# Patient Record
Sex: MALE | Race: WHITE | NOT HISPANIC OR LATINO
[De-identification: names, ages, dates, MRNs, and addresses within clinical notes are randomized per-mention and may not be internally consistent; named-entity substitution may affect disease eponyms.]

---

## 2017-01-01 ENCOUNTER — TRANSCRIPTION ENCOUNTER (OUTPATIENT)
Age: 47
End: 2017-01-01

## 2020-11-24 PROBLEM — Z00.00 ENCOUNTER FOR PREVENTIVE HEALTH EXAMINATION: Status: ACTIVE | Noted: 2020-11-24

## 2021-03-08 ENCOUNTER — TRANSCRIPTION ENCOUNTER (OUTPATIENT)
Age: 51
End: 2021-03-08

## 2021-07-30 ENCOUNTER — APPOINTMENT (OUTPATIENT)
Dept: UROLOGY | Facility: CLINIC | Age: 51
End: 2021-07-30
Payer: MEDICAID

## 2021-07-30 VITALS
TEMPERATURE: 98.2 F | DIASTOLIC BLOOD PRESSURE: 87 MMHG | WEIGHT: 190 LBS | SYSTOLIC BLOOD PRESSURE: 123 MMHG | HEIGHT: 72 IN | BODY MASS INDEX: 25.73 KG/M2 | HEART RATE: 80 BPM

## 2021-07-30 PROCEDURE — 99204 OFFICE O/P NEW MOD 45 MIN: CPT

## 2021-07-30 NOTE — PHYSICAL EXAM
[General Appearance - Well Developed] : well developed [General Appearance - Well Nourished] : well nourished [Normal Appearance] : normal appearance [Well Groomed] : well groomed [General Appearance - In No Acute Distress] : no acute distress [Edema] : no peripheral edema [Respiration, Rhythm And Depth] : normal respiratory rhythm and effort [Exaggerated Use Of Accessory Muscles For Inspiration] : no accessory muscle use [Auscultation Breath Sounds / Voice Sounds] : lungs were clear to auscultation bilaterally [Bowel Sounds] : normal bowel sounds [Abdomen Soft] : soft [Abdomen Tenderness] : non-tender [Abdomen Mass (___ Cm)] : no abdominal mass palpated [Abdomen Hernia] : no hernia was discovered [Costovertebral Angle Tenderness] : no ~M costovertebral angle tenderness [Urethral Meatus] : meatus normal [Penis Abnormality] : normal circumcised penis [Urinary Bladder Findings] : the bladder was normal on palpation [Scrotum] : the scrotum was normal [Epididymis] : the epididymides were normal [Testes Tenderness] : no tenderness of the testes [Testes Mass (___cm)] : there were no testicular masses [Anus Abnormality] : the anus and perineum were normal [Rectal Exam - Rectum] : digital rectal exam was normal [Prostate Tenderness] : the prostate was not tender [No Prostate Nodules] : no prostate nodules [Prostate Size ___ gm] : prostate size [unfilled] gm [Normal Station and Gait] : the gait and station were normal for the patient's age [] : no rash [No Focal Deficits] : no focal deficits [Oriented To Time, Place, And Person] : oriented to person, place, and time [Affect] : the affect was normal [Mood] : the mood was normal [Not Anxious] : not anxious [No Palpable Adenopathy] : no palpable adenopathy [FreeTextEntry1] : Left epididymal head is swollen and tender; left vas is tender; a grade 1 left varicocele is present and is tender and decompresses in the supine position.

## 2021-07-30 NOTE — ASSESSMENT
[FreeTextEntry1] : 1.)  Left testicular pain\par Left epididymitis, and left varicocele\par 7/13/2021 elevated WBC 24.8; absolute lymphocyte count 16,591 (850–3900); absolute monocytes 2877 (200–950); smudge cells present.\par \par Color-flow Doppler scrotal sonogram ordered.\par Levofloxacin 500 mg p.o. daily x2 weeks\par Conservative management reviewed with patient for varicocele.\par Repeat the CBC in 2 weeks\par RTO 3 weeks for reevaluation.

## 2021-07-30 NOTE — REVIEW OF SYSTEMS
[Negative] : Heme/Lymph [Eyesight Problems] : eyesight problems [Shortness Of Breath] : shortness of breath [Abdominal Pain] : abdominal pain

## 2021-07-30 NOTE — HISTORY OF PRESENT ILLNESS
[FreeTextEntry1] : The patient is a 50-year-old gentleman who was seen in the office today for the above.  The left testicular pain started about 6 weeks ago and was at a pain scale of 2/10.  It gradually increased and is now at a 6/10 level.  It has been constant and radiates into the left lower quadrant of the abdomen.  No position makes it better or worse.  It is not associated with nausea or vomiting nor is it associated with urinary burning, increased frequency, increased urgency, gross blood in the urine, fever, chills, or night sweats.\par His daytime frequency is 5 times a day with nocturia x1.  He denies all other urological and constitutional symptomatology.\par \par Past Urological History: Negative\par \par Urological Family History:\par Father-nephrolithiasis

## 2021-08-05 ENCOUNTER — OUTPATIENT (OUTPATIENT)
Dept: OUTPATIENT SERVICES | Facility: HOSPITAL | Age: 51
LOS: 1 days | End: 2021-08-05
Payer: MEDICAID

## 2021-08-05 ENCOUNTER — RESULT REVIEW (OUTPATIENT)
Age: 51
End: 2021-08-05

## 2021-08-05 ENCOUNTER — APPOINTMENT (OUTPATIENT)
Dept: ULTRASOUND IMAGING | Facility: CLINIC | Age: 51
End: 2021-08-05
Payer: MEDICAID

## 2021-08-05 DIAGNOSIS — N50.812 LEFT TESTICULAR PAIN: ICD-10-CM

## 2021-08-05 PROCEDURE — 76870 US EXAM SCROTUM: CPT

## 2021-08-05 PROCEDURE — 93975 VASCULAR STUDY: CPT | Mod: 26

## 2021-08-05 PROCEDURE — 76870 US EXAM SCROTUM: CPT | Mod: 26

## 2021-08-05 PROCEDURE — 93975 VASCULAR STUDY: CPT

## 2021-08-16 ENCOUNTER — LABORATORY RESULT (OUTPATIENT)
Age: 51
End: 2021-08-16

## 2021-08-17 ENCOUNTER — NON-APPOINTMENT (OUTPATIENT)
Age: 51
End: 2021-08-17

## 2021-08-18 ENCOUNTER — TRANSCRIPTION ENCOUNTER (OUTPATIENT)
Age: 51
End: 2021-08-18

## 2021-08-24 LAB
BASOPHILS # BLD AUTO: 0.07 K/UL
BASOPHILS NFR BLD AUTO: 0.3 %
EOSINOPHIL # BLD AUTO: 0.2 K/UL
EOSINOPHIL NFR BLD AUTO: 0.8 %
HCT VFR BLD CALC: 45.8 %
HGB BLD-MCNC: 15.7 G/DL
IMM GRANULOCYTES NFR BLD AUTO: 0.2 %
LYMPHOCYTES # BLD AUTO: 18.36 K/UL
LYMPHOCYTES NFR BLD AUTO: 69.5 %
MAN DIFF?: NORMAL
MCHC RBC-ENTMCNC: 30.2 PG
MCHC RBC-ENTMCNC: 34.3 GM/DL
MCV RBC AUTO: 88.1 FL
MONOCYTES # BLD AUTO: 2.03 K/UL
MONOCYTES NFR BLD AUTO: 7.7 %
NEUTROPHILS # BLD AUTO: 5.7 K/UL
NEUTROPHILS NFR BLD AUTO: 21.5 %
PLATELET # BLD AUTO: 247 K/UL
RBC # BLD: 5.2 M/UL
RBC # FLD: 12.8 %
WBC # FLD AUTO: 26.42 K/UL

## 2021-08-27 ENCOUNTER — APPOINTMENT (OUTPATIENT)
Dept: UROLOGY | Facility: CLINIC | Age: 51
End: 2021-08-27
Payer: MEDICAID

## 2021-08-27 VITALS — TEMPERATURE: 98.1 F

## 2021-08-27 PROCEDURE — 99213 OFFICE O/P EST LOW 20 MIN: CPT

## 2021-08-27 NOTE — ASSESSMENT
[FreeTextEntry1] : 1.)  Left testicular pain\par left varicocele\par 7/13/2021 elevated WBC 24.8; absolute lymphocyte count 16,591 (850–3900); absolute monocytes 2877 (200–950); smudge cells present.\par 8/16/2021: WBC 26.42, hemoglobin 15.7, hematocrit 45.8, platelets 274; lymphocytes elevated at 18.36 (1.00–3.3)\par The patient was referred to hematology for consultation.\par \par 8/5/2021 color-flow Doppler scrotal sonogram : Normal\par Conservative management reviewed with patient for varicocele.\par \par RTO as needed.\par .

## 2021-08-27 NOTE — HISTORY OF PRESENT ILLNESS
[FreeTextEntry1] : The patient is a 50-year-old gentleman who was seen in the office today for the above.  At last visit, he was treated with Levaquin 500 mg p.o. daily for 2 weeks and conservative management for a left varicocele.  Today, he reports that the left testicular pain has markedly decreased to 1/10 from a 7/10.  He feels it mostly in the morning when he wakes up and that is the only time of day that it recurs.  It is tolerable and he is not concerned.  He did not follow the conservative protocol for treatment of a varicocele because the pain had subsided.\par \par His past medical history, surgical history, medication history and allergy history are unchanged.

## 2021-08-27 NOTE — PHYSICAL EXAM
[General Appearance - Well Developed] : well developed [General Appearance - Well Nourished] : well nourished [Normal Appearance] : normal appearance [Well Groomed] : well groomed [General Appearance - In No Acute Distress] : no acute distress [Urethral Meatus] : meatus normal [Penis Abnormality] : normal circumcised penis [Urinary Bladder Findings] : the bladder was normal on palpation [Scrotum] : the scrotum was normal [Epididymis] : the epididymides were normal [Testes Tenderness] : no tenderness of the testes [Testes Mass (___cm)] : there were no testicular masses [Anus Abnormality] : the anus and perineum were normal [Prostate Tenderness] : the prostate was not tender [Rectal Exam - Rectum] : digital rectal exam was normal [No Prostate Nodules] : no prostate nodules [Prostate Size ___ gm] : prostate size [unfilled] gm [] : no rash [Oriented To Time, Place, And Person] : oriented to person, place, and time [Affect] : the affect was normal [Mood] : the mood was normal [Not Anxious] : not anxious [Normal Station and Gait] : the gait and station were normal for the patient's age [No Focal Deficits] : no focal deficits [FreeTextEntry1] : Left epididymal head is ;  a grade 1 left varicocele is present but not tender as it was during the last visit and decompresses in the supine position.

## 2021-09-21 ENCOUNTER — OUTPATIENT (OUTPATIENT)
Dept: OUTPATIENT SERVICES | Facility: HOSPITAL | Age: 51
LOS: 1 days | Discharge: HOME | End: 2021-09-21

## 2021-09-21 DIAGNOSIS — D72.829 ELEVATED WHITE BLOOD CELL COUNT, UNSPECIFIED: ICD-10-CM

## 2021-09-23 ENCOUNTER — RESULT REVIEW (OUTPATIENT)
Age: 51
End: 2021-09-23

## 2021-09-23 ENCOUNTER — LABORATORY RESULT (OUTPATIENT)
Age: 51
End: 2021-09-23

## 2021-09-23 ENCOUNTER — OUTPATIENT (OUTPATIENT)
Dept: OUTPATIENT SERVICES | Facility: HOSPITAL | Age: 51
LOS: 1 days | End: 2021-09-23
Payer: MEDICAID

## 2021-09-23 ENCOUNTER — APPOINTMENT (OUTPATIENT)
Dept: HEMATOLOGY ONCOLOGY | Facility: CLINIC | Age: 51
End: 2021-09-23
Payer: MEDICAID

## 2021-09-23 VITALS
SYSTOLIC BLOOD PRESSURE: 114 MMHG | HEIGHT: 72 IN | DIASTOLIC BLOOD PRESSURE: 76 MMHG | HEART RATE: 80 BPM | WEIGHT: 188.49 LBS | BODY MASS INDEX: 25.53 KG/M2 | OXYGEN SATURATION: 98 % | RESPIRATION RATE: 17 BRPM | TEMPERATURE: 97.4 F

## 2021-09-23 DIAGNOSIS — Z78.9 OTHER SPECIFIED HEALTH STATUS: ICD-10-CM

## 2021-09-23 DIAGNOSIS — N50.812 LEFT TESTICULAR PAIN: ICD-10-CM

## 2021-09-23 DIAGNOSIS — I86.1 SCROTAL VARICES: ICD-10-CM

## 2021-09-23 DIAGNOSIS — C91.10 CHRONIC LYMPHOCYTIC LEUKEMIA OF B-CELL TYPE NOT HAVING ACHIEVED REMISSION: ICD-10-CM

## 2021-09-23 DIAGNOSIS — N45.1 EPIDIDYMITIS: ICD-10-CM

## 2021-09-23 LAB
BASOPHILS # BLD AUTO: 0 K/UL — SIGNIFICANT CHANGE UP (ref 0–0.2)
BASOPHILS NFR BLD AUTO: 0 % — SIGNIFICANT CHANGE UP (ref 0–2)
DAT POLY-SP REAG RBC QL: NEGATIVE — SIGNIFICANT CHANGE UP
EOSINOPHIL # BLD AUTO: 0.3 K/UL — SIGNIFICANT CHANGE UP (ref 0–0.5)
EOSINOPHIL NFR BLD AUTO: 1 % — SIGNIFICANT CHANGE UP (ref 0–6)
HCT VFR BLD CALC: 47.1 % — SIGNIFICANT CHANGE UP (ref 39–50)
HGB BLD-MCNC: 16.3 G/DL — SIGNIFICANT CHANGE UP (ref 13–17)
LYMPHOCYTES # BLD AUTO: 18.92 K/UL — HIGH (ref 1–3.3)
LYMPHOCYTES # BLD AUTO: 64 % — HIGH (ref 13–44)
LYMPHOCYTES # SPEC AUTO: 13 % — HIGH (ref 0–0)
MCHC RBC-ENTMCNC: 29.9 PG — SIGNIFICANT CHANGE UP (ref 27–34)
MCHC RBC-ENTMCNC: 34.6 G/DL — SIGNIFICANT CHANGE UP (ref 32–36)
MCV RBC AUTO: 86.3 FL — SIGNIFICANT CHANGE UP (ref 80–100)
MONOCYTES # BLD AUTO: 0.59 K/UL — SIGNIFICANT CHANGE UP (ref 0–0.9)
MONOCYTES NFR BLD AUTO: 2 % — SIGNIFICANT CHANGE UP (ref 2–14)
NEUTROPHILS # BLD AUTO: 5.91 K/UL — SIGNIFICANT CHANGE UP (ref 1.8–7.4)
NEUTROPHILS NFR BLD AUTO: 20 % — LOW (ref 43–77)
NRBC # BLD: 0 /100 — SIGNIFICANT CHANGE UP (ref 0–0)
NRBC # BLD: SIGNIFICANT CHANGE UP /100 WBCS (ref 0–0)
PLAT MORPH BLD: NORMAL — SIGNIFICANT CHANGE UP
PLATELET # BLD AUTO: 271 K/UL — SIGNIFICANT CHANGE UP (ref 150–400)
RBC # BLD: 5.46 M/UL — SIGNIFICANT CHANGE UP (ref 4.2–5.8)
RBC # FLD: 12.7 % — SIGNIFICANT CHANGE UP (ref 10.3–14.5)
RBC BLD AUTO: NORMAL — SIGNIFICANT CHANGE UP
RETICS #: 80.4 K/UL — SIGNIFICANT CHANGE UP (ref 25–125)
RETICS/RBC NFR: 1.5 % — SIGNIFICANT CHANGE UP (ref 0.5–2.5)
SMUDGE CELLS # BLD: PRESENT — SIGNIFICANT CHANGE UP
WBC # BLD: 29.56 K/UL — HIGH (ref 3.8–10.5)
WBC # FLD AUTO: 29.56 K/UL — HIGH (ref 3.8–10.5)

## 2021-09-23 PROCEDURE — 86901 BLOOD TYPING SEROLOGIC RH(D): CPT

## 2021-09-23 PROCEDURE — 99205 OFFICE O/P NEW HI 60 MIN: CPT

## 2021-09-23 PROCEDURE — 86850 RBC ANTIBODY SCREEN: CPT

## 2021-09-23 PROCEDURE — 86900 BLOOD TYPING SEROLOGIC ABO: CPT

## 2021-09-23 PROCEDURE — 86880 COOMBS TEST DIRECT: CPT

## 2021-09-23 RX ORDER — LEVOFLOXACIN 500 MG/1
500 TABLET, FILM COATED ORAL
Qty: 14 | Refills: 0 | Status: DISCONTINUED | COMMUNITY
Start: 2021-07-30 | End: 2021-09-23

## 2021-09-24 LAB
MANUAL DIF COMMENT BLD-IMP: SIGNIFICANT CHANGE UP

## 2021-09-25 PROBLEM — N45.1 LEFT EPIDIDYMITIS: Status: RESOLVED | Noted: 2021-07-30 | Resolved: 2021-09-25

## 2021-09-25 PROBLEM — I86.1 LEFT VARICOCELE: Status: ACTIVE | Noted: 2021-07-30

## 2021-09-25 PROBLEM — N50.812 LEFT TESTICULAR PAIN: Status: RESOLVED | Noted: 2021-07-30 | Resolved: 2021-09-25

## 2021-09-25 LAB
BILIRUB INDIRECT SERPL-MCNC: 0.3 MG/DL
HBV SURFACE AB SER QL: NONREACTIVE
HIV1+2 AB SPEC QL IA.RAPID: NONREACTIVE
URATE SERPL-MCNC: 5.1 MG/DL

## 2021-09-25 NOTE — PHYSICAL EXAM
[Fully active, able to carry on all pre-disease performance without restriction] : Status 0 - Fully active, able to carry on all pre-disease performance without restriction [Normal] : normal spine exam without palpable tenderness, no kyphosis or scoliosis [de-identified] : no CVAT

## 2021-09-25 NOTE — REVIEW OF SYSTEMS
[Fatigue] : fatigue [Shortness Of Breath] : shortness of breath [Cough] : cough [SOB on Exertion] : shortness of breath during exertion [Joint Pain] : joint pain [Joint Stiffness] : joint stiffness [Negative] : Allergic/Immunologic [Fever] : no fever [Chills] : no chills [Night Sweats] : no night sweats [Recent Change In Weight] : ~T no recent weight change [Wheezing] : no wheezing [Dysuria] : no dysuria [Muscle Weakness] : no muscle weakness [Muscle Pain] : no muscle pain [Easy Bleeding] : no tendency for easy bleeding [Easy Bruising] : no tendency for easy bruising [Swollen Glands] : no swollen glands [FreeTextEntry6] : longstanding cough, occas HANSEN [FreeTextEntry8] : as above, left testis pain resolved [de-identified] : as above [FreeTextEntry9] : left hip, knee

## 2021-09-25 NOTE — ASSESSMENT
[Palliative Care Plan] : not applicable at this time [FreeTextEntry1] : 50-year-old, previously healthy man with recently noted leukocytosis and lymphocytosis.  Hgb and PLT are normal. CBC today reviewed with patient:\par WBC 29.56, Hgb 16.3, PLT 271K, 20% neuts, 64% lymphs, 13% O lymphs,  ANC 5.91, ALC 18.92.\par Peripheral smear shows increased lymphocytes, generally mature, with smudge cells seen.\par The findings point to diagnosis of CLL although other possibilities need to be excluded, particularly a non-Hodgkin lymphoma in leukemic phase.  The morphology of the PB lymphs are more consistent with CLL.\par The diagnosis will be clarified with peripheral blood flow cytometry to be done today.  Additional studies will include a CMP, LDH, beta-2 MG, retake count, MARK, viral serologies, immunoelectrophoresis, ZAP-70, I GVH      hypermutation assay and CLL FISH panel.\par A baseline CT scan of the neck, chest, abdomen and pelvis will be obtained.\par Mr. Fuentes has not had medical care in many years so it is unclear for how long he may have had a relative or absolute increase in his absolute lymphocyte count.  He was reassured that if the diagnosis is indeed CLL, unless he unexpectedly has bulky adenopathy and/or organomegaly, he would initially be observed and could don's well for an indefinite period of time.\par He will be in touch with me within the next few days to review results.\par

## 2021-09-25 NOTE — HISTORY OF PRESENT ILLNESS
[Disease:__________________________] : Disease: [unfilled] [de-identified] : Mr. Fuentes is a 50-year-old man referred for evaluation of leukocytosis with lymphocytosis.  He has had generally good health.  A few weeks ago, he developed left scrotal pain and had a urologic evaluation which demonstrated left epididymitis and a varicocele.  He improved rapidly, was treated with a 2 wk course of levaquin.   Color–flow Doppler scrotal sonogram was normal.\par On 7/13/2021, the WBC was 24.8 with an ALC of 16.59.  Smudge cells were seen present.  Repeat CBC on 8/16 showed the WBC to be 26.42 with a similar absolute lymphocyte count.  Hgb and PLT were normal both times.\par He has several orthopedic complaints and is active in sports.  His past history is otherwise unremarkable.  He has no constitutional complaints. [de-identified] : pending

## 2021-10-05 ENCOUNTER — APPOINTMENT (OUTPATIENT)
Dept: CT IMAGING | Facility: CLINIC | Age: 51
End: 2021-10-05
Payer: MEDICAID

## 2021-10-05 ENCOUNTER — OUTPATIENT (OUTPATIENT)
Dept: OUTPATIENT SERVICES | Facility: HOSPITAL | Age: 51
LOS: 1 days | End: 2021-10-05
Payer: MEDICAID

## 2021-10-05 DIAGNOSIS — C91.10 CHRONIC LYMPHOCYTIC LEUKEMIA OF B-CELL TYPE NOT HAVING ACHIEVED REMISSION: ICD-10-CM

## 2021-10-05 PROCEDURE — 71260 CT THORAX DX C+: CPT | Mod: 26

## 2021-10-05 PROCEDURE — 74177 CT ABD & PELVIS W/CONTRAST: CPT | Mod: 26

## 2021-10-05 PROCEDURE — 74177 CT ABD & PELVIS W/CONTRAST: CPT

## 2021-10-05 PROCEDURE — 71260 CT THORAX DX C+: CPT

## 2021-11-04 ENCOUNTER — APPOINTMENT (OUTPATIENT)
Dept: HEMATOLOGY ONCOLOGY | Facility: CLINIC | Age: 51
End: 2021-11-04
Payer: MEDICAID

## 2021-11-04 PROCEDURE — 99204 OFFICE O/P NEW MOD 45 MIN: CPT | Mod: 95

## 2021-11-04 NOTE — PHYSICAL EXAM
[Fully active, able to carry on all pre-disease performance without restriction] : Status 0 - Fully active, able to carry on all pre-disease performance without restriction [Normal] : affect appropriate [de-identified] : no CVAT

## 2021-11-04 NOTE — HISTORY OF PRESENT ILLNESS
[Disease:__________________________] : Disease: [unfilled] [de-identified] : Mr. Fuentes is a 51 year old male presenting today for 2nd opinion for recent diagnosis of CLL. He initially presented with leukocytosis with lymphocytosis and was seen by our collegue  Dr. Frazier, who did full work up to reveal diagnosis of CLL. Flow cytometry done on 9/23/21 shows  Monotypic B-cells (45% of cells), positive for kappa, CD19, dim CD20, CD23, CD5; negative FMC-7, CD10, CD38, consistent with small lymphocytic lymphoma/chronic lymphocytic leukemia (CD38 negative). 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 IGHV... Most recent cbc with differential done on 9/23/21 shows WBC 29.56, Hgb 16.3, Plt 271, ALC 19.92, and ANC 5.91.He has had generally good health.\par Patient's aunt is a patient of ours (patient is Keri Robledo, diagnosed with CLL over 15 years ago). Patient would like participate in the tissue banking study for CLL. [de-identified] : 13 q\par IGHV...\par  [de-identified] : 11/4/21:Overall patient feel very well. No fevers/chills. No drenching night sweats. No enlarged/botehrsome lymph nodes. No CP/SOB. No abd pain. No easy bruising/bleeding. Normal GI/. Appetite is good. Weight stable. Energy levels good. Patient is active. No recent/recurrent infections. No new medications.

## 2021-11-04 NOTE — REASON FOR VISIT
[Initial Consultation] : an initial consultation for [Home] : at home, [unfilled] , at the time of the visit. [Medical Office: ([facility])___] : at the medical office located in  [CLL] : chronic lymphocytic leukemia [Spouse] : spouse

## 2021-11-04 NOTE — REVIEW OF SYSTEMS
[Fatigue] : fatigue [Shortness Of Breath] : shortness of breath [Cough] : cough [SOB on Exertion] : shortness of breath during exertion [Joint Pain] : joint pain [Joint Stiffness] : joint stiffness [Negative] : Allergic/Immunologic [Fever] : no fever [Chills] : no chills [Night Sweats] : no night sweats [Recent Change In Weight] : ~T no recent weight change [Wheezing] : no wheezing [Dysuria] : no dysuria [Muscle Pain] : no muscle pain [Muscle Weakness] : no muscle weakness [Easy Bleeding] : no tendency for easy bleeding [Easy Bruising] : no tendency for easy bruising [Swollen Glands] : no swollen glands [FreeTextEntry6] : longstanding cough, occas HANSEN [FreeTextEntry8] : as above, left testis pain resolved [FreeTextEntry9] : left hip, knee [de-identified] : as above

## 2021-11-04 NOTE — ASSESSMENT
[Palliative Care Plan] : not applicable at this time [FreeTextEntry1] : 50-year-old male, relatively  healthy with recently noted leukocytosis and lymphocytosis, was seen by our colleague Dr. Frazier for further work up and was diagnosed with CLL by flow cytometry on 9/23/21. 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 IGHV...  Patient presents today for initial consultation seeking 2nd opinion. \par \par CLL:\par -9/23/21 CBC with differential: WBC 29.56, Hgb 16.3, PLT 271K, 20% neuts, 64% lymphs, 13% O lymphs,  ANC 5.91, ALC 18.92. \par -We had a long discussion with the patient  about the clinical nature of CLL. We have discussed that we would not recommend any therapy at this time given that he is asymptomatic with no anemia or concerning thrombocytopenia. We explained that while CLL is not curable, it is a chronic condition and therefore we do not treat it unless it is clinically indicated. We went over the indications for therapy including bulky LAD, splenomegaly, rapidly increasing WBC, anemia, thrombocytopenia or any constitutional complaints.  \par  We have advised the patient to continue with routine maintenance and age appropriate screening including prostate  check, colonoscopy, dermatology evaluation, and flu vaccine. We have explained that he should avoid all live vaccines. We have discussed the increased risk of infection associated with CLL and that the patient should be aware to alert any physicians or treating providers of this diagnosis.\par -patient to continue to follow up with Dr. Frazier for close observation\par -will coordinate with patient and extended family for tissue banking samples \par

## 2022-01-07 ENCOUNTER — OUTPATIENT (OUTPATIENT)
Dept: OUTPATIENT SERVICES | Facility: HOSPITAL | Age: 52
LOS: 1 days | Discharge: HOME | End: 2022-01-07

## 2022-01-07 DIAGNOSIS — D72.829 ELEVATED WHITE BLOOD CELL COUNT, UNSPECIFIED: ICD-10-CM

## 2022-01-10 ENCOUNTER — RESULT REVIEW (OUTPATIENT)
Age: 52
End: 2022-01-10

## 2022-01-10 ENCOUNTER — APPOINTMENT (OUTPATIENT)
Dept: HEMATOLOGY ONCOLOGY | Facility: CLINIC | Age: 52
End: 2022-01-10
Payer: MEDICAID

## 2022-01-10 VITALS
SYSTOLIC BLOOD PRESSURE: 128 MMHG | OXYGEN SATURATION: 99 % | HEART RATE: 81 BPM | BODY MASS INDEX: 26.1 KG/M2 | DIASTOLIC BLOOD PRESSURE: 86 MMHG | TEMPERATURE: 97.1 F | RESPIRATION RATE: 17 BRPM | WEIGHT: 192.44 LBS

## 2022-01-10 LAB
BASOPHILS # BLD AUTO: 0 K/UL — SIGNIFICANT CHANGE UP (ref 0–0.2)
BASOPHILS NFR BLD AUTO: 0 % — SIGNIFICANT CHANGE UP (ref 0–2)
EOSINOPHIL # BLD AUTO: 0.29 K/UL — SIGNIFICANT CHANGE UP (ref 0–0.5)
EOSINOPHIL NFR BLD AUTO: 1 % — SIGNIFICANT CHANGE UP (ref 0–6)
HCT VFR BLD CALC: 50.2 % — HIGH (ref 39–50)
HGB BLD-MCNC: 16.7 G/DL — SIGNIFICANT CHANGE UP (ref 13–17)
LYMPHOCYTES # BLD AUTO: 14.27 K/UL — HIGH (ref 1–3.3)
LYMPHOCYTES # BLD AUTO: 49 % — HIGH (ref 13–44)
LYMPHOCYTES # SPEC AUTO: 16 % — HIGH (ref 0–0)
MCHC RBC-ENTMCNC: 29.7 PG — SIGNIFICANT CHANGE UP (ref 27–34)
MCHC RBC-ENTMCNC: 33.3 G/DL — SIGNIFICANT CHANGE UP (ref 32–36)
MCV RBC AUTO: 89.2 FL — SIGNIFICANT CHANGE UP (ref 80–100)
MONOCYTES # BLD AUTO: 0.87 K/UL — SIGNIFICANT CHANGE UP (ref 0–0.9)
MONOCYTES NFR BLD AUTO: 3 % — SIGNIFICANT CHANGE UP (ref 2–14)
NEUTROPHILS # BLD AUTO: 9.03 K/UL — HIGH (ref 1.8–7.4)
NEUTROPHILS NFR BLD AUTO: 31 % — LOW (ref 43–77)
NRBC # BLD: 0 /100 — SIGNIFICANT CHANGE UP (ref 0–0)
NRBC # BLD: SIGNIFICANT CHANGE UP /100 WBCS (ref 0–0)
PLAT MORPH BLD: NORMAL — SIGNIFICANT CHANGE UP
PLATELET # BLD AUTO: 249 K/UL — SIGNIFICANT CHANGE UP (ref 150–400)
RBC # BLD: 5.63 M/UL — SIGNIFICANT CHANGE UP (ref 4.2–5.8)
RBC # FLD: 12.7 % — SIGNIFICANT CHANGE UP (ref 10.3–14.5)
RBC BLD AUTO: NORMAL — SIGNIFICANT CHANGE UP
SMUDGE CELLS # BLD: PRESENT — SIGNIFICANT CHANGE UP
WBC # BLD: 29.13 K/UL — HIGH (ref 3.8–10.5)
WBC # FLD AUTO: 29.13 K/UL — HIGH (ref 3.8–10.5)

## 2022-01-10 PROCEDURE — 99214 OFFICE O/P EST MOD 30 MIN: CPT

## 2022-01-14 NOTE — PHYSICAL EXAM
[Fully active, able to carry on all pre-disease performance without restriction] : Status 0 - Fully active, able to carry on all pre-disease performance without restriction [Normal] : affect appropriate [de-identified] : no CVAT

## 2022-01-14 NOTE — ASSESSMENT
[Palliative Care Plan] : not applicable at this time [FreeTextEntry1] : 50-year-old, previously healthy man with recently diagnosed B-CLL.  Stage 0.\par He has been generally well.\par CBC results reviewed and d/w pt\par WBC   29.13, Hgb 16.7, PLT 249K, ANC 5.91, ALC 18.92.\par CT C/A/P 10/5/2021 showed no LAD or organomegaly.  Flow cytometry shows a P CLL phenotype, CD38(-).  Immunoelectrophoresis showed a normal SPEP, negative immunofixation, normal immunoglobulin levels except for an IgM of 31.  MARK was negative.  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  He was reassured again that he is in no danger and that he would just have to be watched on a regular basis and that it was quite likely that at some point he would require treatment.\par Blood work today including CMP, LDH, uric acid.\par He is fully vaccinated against COVID.\par RV 4 months or as needed [Palliative] : Goals of care discussed with patient: Palliative

## 2022-01-14 NOTE — REVIEW OF SYSTEMS
[Fatigue] : fatigue [Cough] : cough [SOB on Exertion] : shortness of breath during exertion [Joint Pain] : joint pain [Joint Stiffness] : joint stiffness [Negative] : Allergic/Immunologic [Fever] : no fever [Chills] : no chills [Night Sweats] : no night sweats [Recent Change In Weight] : ~T no recent weight change [Wheezing] : no wheezing [Dysuria] : no dysuria [Muscle Pain] : no muscle pain [Muscle Weakness] : no muscle weakness [Easy Bleeding] : no tendency for easy bleeding [Easy Bruising] : no tendency for easy bruising [Swollen Glands] : no swollen glands [FreeTextEntry9] : left hip, knee [de-identified] : as above

## 2022-01-14 NOTE — HISTORY OF PRESENT ILLNESS
[Disease:__________________________] : Disease: [unfilled] [de-identified] : Mr. Fuentes is a 50-year-old man referred for evaluation of leukocytosis with lymphocytosis.  He has had generally good health.  A few weeks ago, he developed left scrotal pain and had a urologic evaluation which demonstrated left epididymitis and a varicocele.  He improved rapidly, was treated with a 2 wk course of levaquin.   Color–flow Doppler scrotal sonogram was normal.\par On 7/13/2021, the WBC was 24.8 with an ALC of 16.59.  Smudge cells were seen present.  Repeat CBC on 8/16 showed the WBC to be 26.42 with a similar absolute lymphocyte count.  Hgb and PLT were normal both times.\par He has several orthopedic complaints and is active in sports.  His past history is otherwise unremarkable.  He has no constitutional complaints. [de-identified] : pending [de-identified] : Feeling well\par No constitutional c/o\par completed initial evaluation establishing dx, EOD, risk factors 9/2021.

## 2022-04-20 ENCOUNTER — OUTPATIENT (OUTPATIENT)
Dept: OUTPATIENT SERVICES | Facility: HOSPITAL | Age: 52
LOS: 1 days | Discharge: HOME | End: 2022-04-20

## 2022-04-20 DIAGNOSIS — D72.829 ELEVATED WHITE BLOOD CELL COUNT, UNSPECIFIED: ICD-10-CM

## 2022-04-26 ENCOUNTER — LABORATORY RESULT (OUTPATIENT)
Age: 52
End: 2022-04-26

## 2022-04-26 ENCOUNTER — RESULT REVIEW (OUTPATIENT)
Age: 52
End: 2022-04-26

## 2022-04-26 ENCOUNTER — APPOINTMENT (OUTPATIENT)
Dept: HEMATOLOGY ONCOLOGY | Facility: CLINIC | Age: 52
End: 2022-04-26
Payer: MEDICAID

## 2022-04-26 VITALS
BODY MASS INDEX: 25.76 KG/M2 | DIASTOLIC BLOOD PRESSURE: 80 MMHG | WEIGHT: 186.07 LBS | TEMPERATURE: 98.2 F | HEART RATE: 79 BPM | HEIGHT: 71.18 IN | SYSTOLIC BLOOD PRESSURE: 121 MMHG | OXYGEN SATURATION: 99 % | RESPIRATION RATE: 16 BRPM

## 2022-04-26 LAB
BASOPHILS # BLD AUTO: 0 K/UL — SIGNIFICANT CHANGE UP (ref 0–0.2)
BASOPHILS NFR BLD AUTO: 0 % — SIGNIFICANT CHANGE UP (ref 0–2)
EOSINOPHIL # BLD AUTO: 0 K/UL — SIGNIFICANT CHANGE UP (ref 0–0.5)
EOSINOPHIL NFR BLD AUTO: 0 % — SIGNIFICANT CHANGE UP (ref 0–6)
HCT VFR BLD CALC: 44.8 % — SIGNIFICANT CHANGE UP (ref 39–50)
HGB BLD-MCNC: 15 G/DL — SIGNIFICANT CHANGE UP (ref 13–17)
LYMPHOCYTES # BLD AUTO: 15.38 K/UL — HIGH (ref 1–3.3)
LYMPHOCYTES # BLD AUTO: 73 % — HIGH (ref 13–44)
LYMPHOCYTES # SPEC AUTO: 12 % — HIGH (ref 0–0)
MCHC RBC-ENTMCNC: 29.6 PG — SIGNIFICANT CHANGE UP (ref 27–34)
MCHC RBC-ENTMCNC: 33.5 G/DL — SIGNIFICANT CHANGE UP (ref 32–36)
MCV RBC AUTO: 88.5 FL — SIGNIFICANT CHANGE UP (ref 80–100)
MONOCYTES # BLD AUTO: 0.84 K/UL — SIGNIFICANT CHANGE UP (ref 0–0.9)
MONOCYTES NFR BLD AUTO: 4 % — SIGNIFICANT CHANGE UP (ref 2–14)
NEUTROPHILS # BLD AUTO: 2.32 K/UL — SIGNIFICANT CHANGE UP (ref 1.8–7.4)
NEUTROPHILS NFR BLD AUTO: 11 % — LOW (ref 43–77)
NRBC # BLD: 0 /100 — SIGNIFICANT CHANGE UP (ref 0–0)
NRBC # BLD: SIGNIFICANT CHANGE UP /100 WBCS (ref 0–0)
PLAT MORPH BLD: NORMAL — SIGNIFICANT CHANGE UP
PLATELET # BLD AUTO: 273 K/UL — SIGNIFICANT CHANGE UP (ref 150–400)
RBC # BLD: 5.06 M/UL — SIGNIFICANT CHANGE UP (ref 4.2–5.8)
RBC # FLD: 13.3 % — SIGNIFICANT CHANGE UP (ref 10.3–14.5)
RBC BLD AUTO: SIGNIFICANT CHANGE UP
SMUDGE CELLS # BLD: PRESENT — SIGNIFICANT CHANGE UP
WBC # BLD: 21.07 K/UL — HIGH (ref 3.8–10.5)
WBC # FLD AUTO: 21.07 K/UL — HIGH (ref 3.8–10.5)

## 2022-04-26 PROCEDURE — 99214 OFFICE O/P EST MOD 30 MIN: CPT

## 2022-04-26 RX ORDER — LORAZEPAM 1 MG/1
1 TABLET ORAL
Qty: 2 | Refills: 0 | Status: DISCONTINUED | COMMUNITY
Start: 2021-09-28 | End: 2022-04-26

## 2022-04-30 NOTE — REVIEW OF SYSTEMS
[Fatigue] : fatigue [Cough] : cough [SOB on Exertion] : shortness of breath during exertion [Joint Pain] : joint pain [Joint Stiffness] : joint stiffness [Negative] : Heme/Lymph [Fever] : no fever [Chills] : no chills [Night Sweats] : no night sweats [Recent Change In Weight] : ~T no recent weight change [Wheezing] : no wheezing [Dysuria] : no dysuria [Muscle Pain] : no muscle pain [Muscle Weakness] : no muscle weakness [Easy Bleeding] : no tendency for easy bleeding [Easy Bruising] : no tendency for easy bruising [Swollen Glands] : no swollen glands [FreeTextEntry7] : s/p appendectomy as above [FreeTextEntry9] : left hip, knee chronic

## 2022-04-30 NOTE — HISTORY OF PRESENT ILLNESS
[Disease:__________________________] : Disease: [unfilled] [de-identified] : Mr. Fuentes is a 50-year-old man referred for evaluation of leukocytosis with lymphocytosis.  He has had generally good health.  A few weeks ago, he developed left scrotal pain and had a urologic evaluation which demonstrated left epididymitis and a varicocele.  He improved rapidly, was treated with a 2 wk course of levaquin.   Color–flow Doppler scrotal sonogram was normal.\par On 7/13/2021, the WBC was 24.8 with an ALC of 16.59.  Smudge cells were seen present.  Repeat CBC on 8/16 showed the WBC to be 26.42 with a similar absolute lymphocyte count.  Hgb and PLT were normal both times.\par He has several orthopedic complaints and is active in sports.  His past history is otherwise unremarkable.  He has no constitutional complaints. [de-identified] : pending [de-identified] : Feeling well\par No constitutional c/o\par completed initial evaluation establishing dx, EOD, risk factors 9/2021.\par s/p appendectomy 4/4/22 at [place], uneventful recovery

## 2022-04-30 NOTE — ASSESSMENT
[Palliative] : Goals of care discussed with patient: Palliative [Palliative Care Plan] : not applicable at this time [FreeTextEntry1] : 51-year-old, previously healthy man with B-CLL,  Stage 0.\par He remains generally well except recent episode acute appendicitis, s/p lap  appy earlier 4/2022\par CBC results reviewed and d/w pt\par WBC 21.07,  Hgb 15.0, PLT 273K, ANC 2.32 , ALC 15.38\par CT C/A/P 10/5/2021 showed no LAD or organomegaly.  Flow cytometry showed a B- CLL phenotype, CD38(-).  Immunoelectrophoresis showed a normal SPEP, negative immunofixation, normal immunoglobulin levels except for an IgM of 31.  MARK was negative.  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

## 2022-04-30 NOTE — PHYSICAL EXAM
[Fully active, able to carry on all pre-disease performance without restriction] : Status 0 - Fully active, able to carry on all pre-disease performance without restriction [Normal] : affect appropriate [de-identified] : well healed lap appy scars [de-identified] : no CVAT

## 2022-07-22 ENCOUNTER — OUTPATIENT (OUTPATIENT)
Dept: OUTPATIENT SERVICES | Facility: HOSPITAL | Age: 52
LOS: 1 days | Discharge: HOME | End: 2022-07-22

## 2022-07-22 DIAGNOSIS — D72.829 ELEVATED WHITE BLOOD CELL COUNT, UNSPECIFIED: ICD-10-CM

## 2022-08-02 ENCOUNTER — RESULT REVIEW (OUTPATIENT)
Age: 52
End: 2022-08-02

## 2022-08-02 ENCOUNTER — APPOINTMENT (OUTPATIENT)
Dept: HEMATOLOGY ONCOLOGY | Facility: CLINIC | Age: 52
End: 2022-08-02

## 2022-08-02 VITALS
HEART RATE: 68 BPM | WEIGHT: 185.85 LBS | OXYGEN SATURATION: 98 % | SYSTOLIC BLOOD PRESSURE: 142 MMHG | HEIGHT: 71.73 IN | DIASTOLIC BLOOD PRESSURE: 84 MMHG | BODY MASS INDEX: 25.45 KG/M2 | TEMPERATURE: 98.2 F | RESPIRATION RATE: 16 BRPM

## 2022-08-02 VITALS — SYSTOLIC BLOOD PRESSURE: 133 MMHG | DIASTOLIC BLOOD PRESSURE: 87 MMHG

## 2022-08-02 LAB
BASOPHILS # BLD AUTO: 0 K/UL — SIGNIFICANT CHANGE UP (ref 0–0.2)
BASOPHILS NFR BLD AUTO: 0 % — SIGNIFICANT CHANGE UP (ref 0–2)
EOSINOPHIL # BLD AUTO: 0.24 K/UL — SIGNIFICANT CHANGE UP (ref 0–0.5)
EOSINOPHIL NFR BLD AUTO: 1 % — SIGNIFICANT CHANGE UP (ref 0–6)
HCT VFR BLD CALC: 44.7 % — SIGNIFICANT CHANGE UP (ref 39–50)
HGB BLD-MCNC: 15.3 G/DL — SIGNIFICANT CHANGE UP (ref 13–17)
LYMPHOCYTES # BLD AUTO: 14.4 K/UL — HIGH (ref 1–3.3)
LYMPHOCYTES # BLD AUTO: 60 % — HIGH (ref 13–44)
LYMPHOCYTES # SPEC AUTO: 12 % — HIGH (ref 0–0)
MCHC RBC-ENTMCNC: 30.1 PG — SIGNIFICANT CHANGE UP (ref 27–34)
MCHC RBC-ENTMCNC: 34.2 G/DL — SIGNIFICANT CHANGE UP (ref 32–36)
MCV RBC AUTO: 88 FL — SIGNIFICANT CHANGE UP (ref 80–100)
MONOCYTES # BLD AUTO: 0.96 K/UL — HIGH (ref 0–0.9)
MONOCYTES NFR BLD AUTO: 4 % — SIGNIFICANT CHANGE UP (ref 2–14)
NEUTROPHILS # BLD AUTO: 5.52 K/UL — SIGNIFICANT CHANGE UP (ref 1.8–7.4)
NEUTROPHILS NFR BLD AUTO: 23 % — LOW (ref 43–77)
NRBC # BLD: 0 /100 — SIGNIFICANT CHANGE UP (ref 0–0)
NRBC # BLD: SIGNIFICANT CHANGE UP /100 WBCS (ref 0–0)
PLAT MORPH BLD: NORMAL — SIGNIFICANT CHANGE UP
PLATELET # BLD AUTO: 210 K/UL — SIGNIFICANT CHANGE UP (ref 150–400)
RBC # BLD: 5.08 M/UL — SIGNIFICANT CHANGE UP (ref 4.2–5.8)
RBC # FLD: 12.8 % — SIGNIFICANT CHANGE UP (ref 10.3–14.5)
RBC BLD AUTO: SIGNIFICANT CHANGE UP
SMUDGE CELLS # BLD: PRESENT — SIGNIFICANT CHANGE UP
URATE SERPL-MCNC: 4.4 MG/DL
WBC # BLD: 24 K/UL — HIGH (ref 3.8–10.5)
WBC # FLD AUTO: 24 K/UL — HIGH (ref 3.8–10.5)

## 2022-08-02 PROCEDURE — 99213 OFFICE O/P EST LOW 20 MIN: CPT

## 2022-08-02 NOTE — ASSESSMENT
[Palliative] : Goals of care discussed with patient: Palliative [Palliative Care Plan] : not applicable at this time [FreeTextEntry1] : 51-year-old, previously healthy man with B-CLL,  Stage 0.\par He remains generally well except  episode acute appendicitis earlier in 2022, s/p lap  appy  4/2022\par CBC results reviewed and d/w pt\par WBC 24.00,  Hgb 15.3, PLT 210K, ANC 5.52 , ALC 14.40\par CT C/A/P 10/5/2021 showed no LAD or organomegaly.  Flow cytometry showed a B- CLL phenotype, CD38(-).  Immunoelectrophoresis showed a normal SPEP, negative immunofixation, normal immunoglobulin levels except for an IgM of 31.  MARK was negative.  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

## 2022-08-02 NOTE — PHYSICAL EXAM
[Fully active, able to carry on all pre-disease performance without restriction] : Status 0 - Fully active, able to carry on all pre-disease performance without restriction [Normal] : affect appropriate [de-identified] : well healed lap appy scars [de-identified] : no CVAT

## 2022-08-02 NOTE — HISTORY OF PRESENT ILLNESS
[Disease:__________________________] : Disease: [unfilled] [de-identified] : Mr. Fuentes is a 50-year-old man referred for evaluation of leukocytosis with lymphocytosis.  He has had generally good health.  A few weeks ago, he developed left scrotal pain and had a urologic evaluation which demonstrated left epididymitis and a varicocele.  He improved rapidly, was treated with a 2 wk course of levaquin.   Color–flow Doppler scrotal sonogram was normal.\par On 7/13/2021, the WBC was 24.8 with an ALC of 16.59.  Smudge cells were seen present.  Repeat CBC on 8/16 showed the WBC to be 26.42 with a similar absolute lymphocyte count.  Hgb and PLT were normal both times.\par He has several orthopedic complaints and is active in sports.  His past history is otherwise unremarkable.  He has no constitutional complaints. [de-identified] : pending [de-identified] : Feeling well\par No constitutional c/o\par completed initial evaluation establishing dx, EOD, risk factors 9/2021.\par s/p appendectomy 4/4/22 at [place], uneventful recovery\par no new c/o

## 2022-08-02 NOTE — REVIEW OF SYSTEMS
[Fatigue] : fatigue [Cough] : cough [SOB on Exertion] : shortness of breath during exertion [Joint Pain] : joint pain [Joint Stiffness] : joint stiffness [Negative] : Allergic/Immunologic [Fever] : no fever [Chills] : no chills [Night Sweats] : no night sweats [Recent Change In Weight] : ~T no recent weight change [Wheezing] : no wheezing [Dysuria] : no dysuria [Muscle Pain] : no muscle pain [Muscle Weakness] : no muscle weakness [Easy Bleeding] : no tendency for easy bleeding [Easy Bruising] : no tendency for easy bruising [Swollen Glands] : no swollen glands [FreeTextEntry7] : s/p appendectomy as above [FreeTextEntry9] : left hip, knee chronic

## 2022-12-01 ENCOUNTER — OUTPATIENT (OUTPATIENT)
Dept: OUTPATIENT SERVICES | Facility: HOSPITAL | Age: 52
LOS: 1 days | Discharge: HOME | End: 2022-12-01

## 2022-12-01 DIAGNOSIS — D72.829 ELEVATED WHITE BLOOD CELL COUNT, UNSPECIFIED: ICD-10-CM

## 2022-12-05 ENCOUNTER — RESULT REVIEW (OUTPATIENT)
Age: 52
End: 2022-12-05

## 2022-12-05 ENCOUNTER — APPOINTMENT (OUTPATIENT)
Dept: HEMATOLOGY ONCOLOGY | Facility: CLINIC | Age: 52
End: 2022-12-05

## 2022-12-05 VITALS
SYSTOLIC BLOOD PRESSURE: 119 MMHG | DIASTOLIC BLOOD PRESSURE: 82 MMHG | HEART RATE: 107 BPM | TEMPERATURE: 98.4 F | OXYGEN SATURATION: 96 % | RESPIRATION RATE: 16 BRPM | BODY MASS INDEX: 25.43 KG/M2 | WEIGHT: 186.07 LBS

## 2022-12-05 DIAGNOSIS — D72.820 LYMPHOCYTOSIS (SYMPTOMATIC): ICD-10-CM

## 2022-12-05 LAB
BASOPHILS # BLD AUTO: 0 K/UL — SIGNIFICANT CHANGE UP (ref 0–0.2)
BASOPHILS NFR BLD AUTO: 0 % — SIGNIFICANT CHANGE UP (ref 0–2)
EOSINOPHIL # BLD AUTO: 0.41 K/UL — SIGNIFICANT CHANGE UP (ref 0–0.5)
EOSINOPHIL NFR BLD AUTO: 1 % — SIGNIFICANT CHANGE UP (ref 0–6)
HCT VFR BLD CALC: 49.2 % — SIGNIFICANT CHANGE UP (ref 39–50)
HGB BLD-MCNC: 16.3 G/DL — SIGNIFICANT CHANGE UP (ref 13–17)
LYMPHOCYTES # BLD AUTO: 23.81 K/UL — HIGH (ref 1–3.3)
LYMPHOCYTES # BLD AUTO: 58 % — HIGH (ref 13–44)
LYMPHOCYTES # SPEC AUTO: 19 % — HIGH (ref 0–0)
MCHC RBC-ENTMCNC: 29.2 PG — SIGNIFICANT CHANGE UP (ref 27–34)
MCHC RBC-ENTMCNC: 33.1 G/DL — SIGNIFICANT CHANGE UP (ref 32–36)
MCV RBC AUTO: 88.2 FL — SIGNIFICANT CHANGE UP (ref 80–100)
MONOCYTES # BLD AUTO: 2.05 K/UL — HIGH (ref 0–0.9)
MONOCYTES NFR BLD AUTO: 5 % — SIGNIFICANT CHANGE UP (ref 2–14)
NEUTROPHILS # BLD AUTO: 6.98 K/UL — SIGNIFICANT CHANGE UP (ref 1.8–7.4)
NEUTROPHILS NFR BLD AUTO: 17 % — LOW (ref 43–77)
NRBC # BLD: 0 /100 — SIGNIFICANT CHANGE UP (ref 0–0)
NRBC # BLD: SIGNIFICANT CHANGE UP /100 WBCS (ref 0–0)
PLAT MORPH BLD: NORMAL — SIGNIFICANT CHANGE UP
PLATELET # BLD AUTO: 283 K/UL — SIGNIFICANT CHANGE UP (ref 150–400)
RBC # BLD: 5.58 M/UL — SIGNIFICANT CHANGE UP (ref 4.2–5.8)
RBC # FLD: 13.3 % — SIGNIFICANT CHANGE UP (ref 10.3–14.5)
RBC BLD AUTO: SIGNIFICANT CHANGE UP
SMUDGE CELLS # BLD: PRESENT — SIGNIFICANT CHANGE UP
WBC # BLD: 41.05 K/UL — CRITICAL HIGH (ref 3.8–10.5)
WBC # FLD AUTO: 41.05 K/UL — CRITICAL HIGH (ref 3.8–10.5)

## 2022-12-05 PROCEDURE — 99213 OFFICE O/P EST LOW 20 MIN: CPT

## 2022-12-06 ENCOUNTER — NON-APPOINTMENT (OUTPATIENT)
Age: 52
End: 2022-12-06

## 2022-12-07 RX ORDER — TRIAMCINOLONE ACETONIDE 1 MG/G
0.1 CREAM TOPICAL
Qty: 60 | Refills: 0 | Status: COMPLETED | COMMUNITY
Start: 2022-06-28

## 2022-12-07 RX ORDER — ALBUTEROL SULFATE 90 UG/1
108 (90 BASE) INHALANT RESPIRATORY (INHALATION)
Qty: 8 | Refills: 0 | Status: COMPLETED | COMMUNITY
Start: 2022-11-17

## 2022-12-07 RX ORDER — AZITHROMYCIN 250 MG/1
250 TABLET, FILM COATED ORAL
Qty: 6 | Refills: 0 | Status: COMPLETED | COMMUNITY
Start: 2022-11-15

## 2022-12-07 RX ORDER — BUDESONIDE AND FORMOTEROL FUMARATE DIHYDRATE 80; 4.5 UG/1; UG/1
80-4.5 AEROSOL RESPIRATORY (INHALATION)
Qty: 10 | Refills: 0 | Status: COMPLETED | COMMUNITY
Start: 2022-11-29

## 2022-12-07 RX ORDER — AMOXICILLIN AND CLAVULANATE POTASSIUM 875; 125 MG/1; MG/1
875-125 TABLET, COATED ORAL
Qty: 10 | Refills: 0 | Status: COMPLETED | COMMUNITY
Start: 2022-11-21

## 2022-12-07 RX ORDER — BENZONATATE 100 MG/1
100 CAPSULE ORAL
Qty: 40 | Refills: 0 | Status: COMPLETED | COMMUNITY
Start: 2022-11-12

## 2022-12-07 NOTE — PHYSICAL EXAM
[Fully active, able to carry on all pre-disease performance without restriction] : Status 0 - Fully active, able to carry on all pre-disease performance without restriction [Normal] : affect appropriate [de-identified] : well healed lap appy scars [de-identified] : no CVAT

## 2022-12-07 NOTE — REVIEW OF SYSTEMS
[Fatigue] : fatigue [Cough] : cough [Joint Pain] : joint pain [Joint Stiffness] : joint stiffness [Negative] : Allergic/Immunologic [Fever] : no fever [Chills] : no chills [Night Sweats] : no night sweats [Recent Change In Weight] : ~T no recent weight change [Wheezing] : no wheezing [SOB on Exertion] : no shortness of breath during exertion [Dysuria] : no dysuria [Muscle Pain] : no muscle pain [Muscle Weakness] : no muscle weakness [Easy Bleeding] : no tendency for easy bleeding [Easy Bruising] : no tendency for easy bruising [Swollen Glands] : no swollen glands [FreeTextEntry7] : s/p appendectomy as above [FreeTextEntry9] : left hip, knee chronic

## 2022-12-07 NOTE — HISTORY OF PRESENT ILLNESS
[Disease:__________________________] : Disease: [unfilled] [de-identified] : Mr. Fuentes is a 50-year-old man referred for evaluation of leukocytosis with lymphocytosis.  He has had generally good health.  A few weeks ago, he developed left scrotal pain and had a urologic evaluation which demonstrated left epididymitis and a varicocele.  He improved rapidly, was treated with a 2 wk course of levaquin.   Color–flow Doppler scrotal sonogram was normal.\par On 7/13/2021, the WBC was 24.8 with an ALC of 16.59.  Smudge cells were seen present.  Repeat CBC on 8/16 showed the WBC to be 26.42 with a similar absolute lymphocyte count.  Hgb and PLT were normal both times.\par He has several orthopedic complaints and is active in sports.  His past history is otherwise unremarkable.  He has no constitutional complaints. [de-identified] : pending [de-identified] : tested covid+ around september w/o sxs but later on developed chills, cough in november, found to have infiltrates on CXR suggestive of PNA, s/p course of abx, negative U/S leg study, on symbicort \par Feels better now, no constitutional c/o, persistent cough which has improved\par completed initial evaluation establishing dx, EOD, risk factors 9/2021.\par s/p appendectomy 4/4/22 at [place], uneventful recovery

## 2022-12-07 NOTE — ASSESSMENT
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  Flow cytometry showed a B- CLL phenotype, CD38(-).  Immunoelectrophoresis showed a normal SPEP, negative immunofixation, normal immunoglobulin levels except for an IgM of 31.  MARK was negative.  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 Frazier\par Abraham Sheppard M.D.  PGY4\par Fellow, Hematology/Oncology\par \par RV 4 mos\par \par

## 2022-12-07 NOTE — END OF VISIT
[] : Fellow [FreeTextEntry3] : Lymphocytosis more pronounced in setting of recent resp infection.  Hgb, Plt stable; overall doing well.\par To continue observation.

## 2023-03-24 ENCOUNTER — OUTPATIENT (OUTPATIENT)
Dept: OUTPATIENT SERVICES | Facility: HOSPITAL | Age: 53
LOS: 1 days | Discharge: HOME | End: 2023-03-24

## 2023-03-24 DIAGNOSIS — D72.829 ELEVATED WHITE BLOOD CELL COUNT, UNSPECIFIED: ICD-10-CM

## 2023-03-27 ENCOUNTER — APPOINTMENT (OUTPATIENT)
Dept: HEMATOLOGY ONCOLOGY | Facility: CLINIC | Age: 53
End: 2023-03-27
Payer: MEDICAID

## 2023-03-27 ENCOUNTER — RESULT REVIEW (OUTPATIENT)
Age: 53
End: 2023-03-27

## 2023-03-27 VITALS
SYSTOLIC BLOOD PRESSURE: 117 MMHG | BODY MASS INDEX: 26.66 KG/M2 | RESPIRATION RATE: 16 BRPM | OXYGEN SATURATION: 99 % | DIASTOLIC BLOOD PRESSURE: 80 MMHG | HEART RATE: 79 BPM | HEIGHT: 71.69 IN | TEMPERATURE: 97 F | WEIGHT: 194.67 LBS

## 2023-03-27 DIAGNOSIS — Z87.19 PERSONAL HISTORY OF OTHER DISEASES OF THE DIGESTIVE SYSTEM: ICD-10-CM

## 2023-03-27 DIAGNOSIS — Z87.01 PERSONAL HISTORY OF PNEUMONIA (RECURRENT): ICD-10-CM

## 2023-03-27 LAB
ALBUMIN SERPL ELPH-MCNC: 4.6 G/DL
ALP BLD-CCNC: 116 U/L
ALT SERPL-CCNC: 57 U/L
ANION GAP SERPL CALC-SCNC: 12 MMOL/L
AST SERPL-CCNC: 36 U/L
BASOPHILS # BLD AUTO: 0 K/UL — SIGNIFICANT CHANGE UP (ref 0–0.2)
BASOPHILS NFR BLD AUTO: 0 % — SIGNIFICANT CHANGE UP (ref 0–2)
BILIRUB SERPL-MCNC: 0.4 MG/DL
BUN SERPL-MCNC: 21 MG/DL
CALCIUM SERPL-MCNC: 9.9 MG/DL
CHLORIDE SERPL-SCNC: 101 MMOL/L
CO2 SERPL-SCNC: 24 MMOL/L
CREAT SERPL-MCNC: 1.01 MG/DL
DEPRECATED KAPPA LC FREE/LAMBDA SER: 2.92 RATIO
EGFR: 89 ML/MIN/1.73M2
EOSINOPHIL # BLD AUTO: 0 K/UL — SIGNIFICANT CHANGE UP (ref 0–0.5)
EOSINOPHIL NFR BLD AUTO: 0 % — SIGNIFICANT CHANGE UP (ref 0–6)
GLUCOSE SERPL-MCNC: 109 MG/DL
HCT VFR BLD CALC: 48.6 % — SIGNIFICANT CHANGE UP (ref 39–50)
HGB BLD-MCNC: 16.4 G/DL — SIGNIFICANT CHANGE UP (ref 13–17)
IGA SER QL IEP: 271 MG/DL
IGG SER QL IEP: 1162 MG/DL
IGM SER QL IEP: 53 MG/DL
KAPPA LC CSF-MCNC: 1.13 MG/DL
KAPPA LC SERPL-MCNC: 3.3 MG/DL
LDH SERPL-CCNC: 190 U/L
LYMPHOCYTES # BLD AUTO: 23.65 K/UL — HIGH (ref 1–3.3)
LYMPHOCYTES # BLD AUTO: 78 % — HIGH (ref 13–44)
LYMPHOCYTES # SPEC AUTO: 3 % — HIGH (ref 0–0)
MCHC RBC-ENTMCNC: 29.7 PG — SIGNIFICANT CHANGE UP (ref 27–34)
MCHC RBC-ENTMCNC: 33.7 G/DL — SIGNIFICANT CHANGE UP (ref 32–36)
MCV RBC AUTO: 87.9 FL — SIGNIFICANT CHANGE UP (ref 80–100)
MONOCYTES # BLD AUTO: 0.3 K/UL — SIGNIFICANT CHANGE UP (ref 0–0.9)
MONOCYTES NFR BLD AUTO: 1 % — LOW (ref 2–14)
NEUTROPHILS # BLD AUTO: 5.46 K/UL — SIGNIFICANT CHANGE UP (ref 1.8–7.4)
NEUTROPHILS NFR BLD AUTO: 18 % — LOW (ref 43–77)
NRBC # BLD: 0 /100 — SIGNIFICANT CHANGE UP (ref 0–0)
NRBC # BLD: SIGNIFICANT CHANGE UP /100 WBCS (ref 0–0)
PLAT MORPH BLD: NORMAL — SIGNIFICANT CHANGE UP
PLATELET # BLD AUTO: 227 K/UL — SIGNIFICANT CHANGE UP (ref 150–400)
POTASSIUM SERPL-SCNC: 4.6 MMOL/L
PROT SERPL-MCNC: 7.4 G/DL
RBC # BLD: 5.53 M/UL — SIGNIFICANT CHANGE UP (ref 4.2–5.8)
RBC # FLD: 12.7 % — SIGNIFICANT CHANGE UP (ref 10.3–14.5)
RBC BLD AUTO: SIGNIFICANT CHANGE UP
SMUDGE CELLS # BLD: PRESENT — SIGNIFICANT CHANGE UP
SODIUM SERPL-SCNC: 138 MMOL/L
URATE SERPL-MCNC: 6 MG/DL
WBC # BLD: 30.32 K/UL — HIGH (ref 3.8–10.5)
WBC # FLD AUTO: 30.32 K/UL — HIGH (ref 3.8–10.5)

## 2023-03-27 PROCEDURE — 99213 OFFICE O/P EST LOW 20 MIN: CPT

## 2023-03-27 NOTE — REVIEW OF SYSTEMS
[Fatigue] : fatigue [Joint Pain] : joint pain [Joint Stiffness] : joint stiffness [Fever] : no fever [Chills] : no chills [Night Sweats] : no night sweats [Recent Change In Weight] : ~T no recent weight change [Wheezing] : no wheezing [SOB on Exertion] : no shortness of breath during exertion [Dysuria] : no dysuria [Muscle Pain] : no muscle pain [Muscle Weakness] : no muscle weakness [Easy Bleeding] : no tendency for easy bleeding [Easy Bruising] : no tendency for easy bruising [Swollen Glands] : no swollen glands [Negative] : Gastrointestinal [FreeTextEntry6] : resolution of PNA [FreeTextEntry9] : left hip, knee chronic

## 2023-03-27 NOTE — ASSESSMENT
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  CT 4/2/2022 done at \par [facility] when he had appendicitis also showed no organomegaly or LAD. \par Flow cytometry showed a B- CLL phenotype, CD38(-).  Immunoelectrophoresis showed a normal SPEP, negative immunofixation, normal immunoglobulin levels except for an IgM of 31.  MARK was negative.  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

## 2023-03-27 NOTE — HISTORY OF PRESENT ILLNESS
[Disease:__________________________] : Disease: [unfilled] [de-identified] : Mr. Fuentes is a 50-year-old man referred for evaluation of leukocytosis with lymphocytosis.  He has had generally good health.  A few weeks ago, he developed left scrotal pain and had a urologic evaluation which demonstrated left epididymitis and a varicocele.  He improved rapidly, was treated with a 2 wk course of levaquin.   Color–flow Doppler scrotal sonogram was normal.\par On 7/13/2021, the WBC was 24.8 with an ALC of 16.59.  Smudge cells were seen present.  Repeat CBC on 8/16 showed the WBC to be 26.42 with a similar absolute lymphocyte count.  Hgb and PLT were normal both times.\par He has several orthopedic complaints and is active in sports.  His past history is otherwise unremarkable.  He has no constitutional complaints. [de-identified] : pending [de-identified] : tested covid+ around 9/2022 w/o sxs but later on developed chills, cough in 11/2022 \par CXR + infiltrates, s/p course of abx, negative U/S leg study, on symbicort \par Feels better now, no constitutional c/o,  cough now resolved\par completed initial evaluation establishing dx, EOD, risk factors 9/2021.\par s/p appendectomy 4/4/22 at [place], uneventful recovery\par WBC incr to 41.05 12/5/22 in setting of recent pulm infection

## 2023-03-27 NOTE — PHYSICAL EXAM
[Fully active, able to carry on all pre-disease performance without restriction] : Status 0 - Fully active, able to carry on all pre-disease performance without restriction [Normal] : affect appropriate [de-identified] : no CVAT

## 2023-08-11 ENCOUNTER — OUTPATIENT (OUTPATIENT)
Dept: OUTPATIENT SERVICES | Facility: HOSPITAL | Age: 53
LOS: 1 days | Discharge: HOME | End: 2023-08-11

## 2023-08-11 DIAGNOSIS — D72.829 ELEVATED WHITE BLOOD CELL COUNT, UNSPECIFIED: ICD-10-CM

## 2023-08-18 DIAGNOSIS — D72.829 ELEVATED WHITE BLOOD CELL COUNT, UNSPECIFIED: ICD-10-CM

## 2023-08-21 ENCOUNTER — RESULT REVIEW (OUTPATIENT)
Age: 53
End: 2023-08-21

## 2023-08-21 ENCOUNTER — APPOINTMENT (OUTPATIENT)
Dept: HEMATOLOGY ONCOLOGY | Facility: CLINIC | Age: 53
End: 2023-08-21
Payer: MEDICAID

## 2023-08-21 VITALS
OXYGEN SATURATION: 99 % | BODY MASS INDEX: 26.51 KG/M2 | WEIGHT: 193.79 LBS | HEART RATE: 75 BPM | RESPIRATION RATE: 16 BRPM | SYSTOLIC BLOOD PRESSURE: 114 MMHG | TEMPERATURE: 97.2 F | DIASTOLIC BLOOD PRESSURE: 76 MMHG

## 2023-08-21 LAB
ALBUMIN SERPL ELPH-MCNC: 4.6 G/DL
ALP BLD-CCNC: 56 U/L
ALT SERPL-CCNC: 22 U/L
ANION GAP SERPL CALC-SCNC: 12 MMOL/L
AST SERPL-CCNC: 22 U/L
BASOPHILS # BLD AUTO: 0 K/UL — SIGNIFICANT CHANGE UP (ref 0–0.2)
BASOPHILS NFR BLD AUTO: 0 % — SIGNIFICANT CHANGE UP (ref 0–2)
BILIRUB SERPL-MCNC: 0.5 MG/DL
BUN SERPL-MCNC: 22 MG/DL
CALCIUM SERPL-MCNC: 9.5 MG/DL
CHLORIDE SERPL-SCNC: 102 MMOL/L
CO2 SERPL-SCNC: 25 MMOL/L
CREAT SERPL-MCNC: 1 MG/DL
DEPRECATED KAPPA LC FREE/LAMBDA SER: 3 RATIO
EGFR: 90 ML/MIN/1.73M2
EOSINOPHIL # BLD AUTO: 0.47 K/UL — SIGNIFICANT CHANGE UP (ref 0–0.5)
EOSINOPHIL NFR BLD AUTO: 1.5 % — SIGNIFICANT CHANGE UP (ref 0–6)
GLUCOSE SERPL-MCNC: 105 MG/DL
HCT VFR BLD CALC: 44.6 % — SIGNIFICANT CHANGE UP (ref 39–50)
HGB BLD-MCNC: 15 G/DL — SIGNIFICANT CHANGE UP (ref 13–17)
IGA SER QL IEP: 138 MG/DL
IGG SER QL IEP: 708 MG/DL
IGM SER QL IEP: 21 MG/DL
KAPPA LC CSF-MCNC: 0.91 MG/DL
KAPPA LC SERPL-MCNC: 2.73 MG/DL
LDH SERPL-CCNC: 161 U/L
LYMPHOCYTES # BLD AUTO: 23.33 K/UL — HIGH (ref 1–3.3)
LYMPHOCYTES # BLD AUTO: 74.5 % — HIGH (ref 13–44)
LYMPHOCYTES # SPEC AUTO: 4 % — HIGH (ref 0–0)
MCHC RBC-ENTMCNC: 29.3 PG — SIGNIFICANT CHANGE UP (ref 27–34)
MCHC RBC-ENTMCNC: 33.6 G/DL — SIGNIFICANT CHANGE UP (ref 32–36)
MCV RBC AUTO: 87.1 FL — SIGNIFICANT CHANGE UP (ref 80–100)
MONOCYTES # BLD AUTO: 1.1 K/UL — HIGH (ref 0–0.9)
MONOCYTES NFR BLD AUTO: 3.5 % — SIGNIFICANT CHANGE UP (ref 2–14)
NEUTROPHILS # BLD AUTO: 5.17 K/UL — SIGNIFICANT CHANGE UP (ref 1.8–7.4)
NEUTROPHILS NFR BLD AUTO: 16.5 % — LOW (ref 43–77)
NRBC # BLD: 0 /100 — SIGNIFICANT CHANGE UP (ref 0–0)
NRBC # BLD: SIGNIFICANT CHANGE UP /100 WBCS (ref 0–0)
PLAT MORPH BLD: NORMAL — SIGNIFICANT CHANGE UP
PLATELET # BLD AUTO: 207 K/UL — SIGNIFICANT CHANGE UP (ref 150–400)
POTASSIUM SERPL-SCNC: 4.5 MMOL/L
PROT SERPL-MCNC: 6.5 G/DL
RBC # BLD: 5.12 M/UL — SIGNIFICANT CHANGE UP (ref 4.2–5.8)
RBC # FLD: 13.1 % — SIGNIFICANT CHANGE UP (ref 10.3–14.5)
RBC BLD AUTO: SIGNIFICANT CHANGE UP
SMUDGE CELLS # BLD: PRESENT — SIGNIFICANT CHANGE UP
SODIUM SERPL-SCNC: 139 MMOL/L
URATE SERPL-MCNC: 5.7 MG/DL
WBC # BLD: 31.31 K/UL — HIGH (ref 3.8–10.5)
WBC # FLD AUTO: 31.31 K/UL — HIGH (ref 3.8–10.5)

## 2023-08-21 PROCEDURE — 99213 OFFICE O/P EST LOW 20 MIN: CPT

## 2023-08-22 NOTE — PHYSICAL EXAM
[Fully active, able to carry on all pre-disease performance without restriction] : Status 0 - Fully active, able to carry on all pre-disease performance without restriction [Normal] : affect appropriate [de-identified] : no CVAT

## 2023-08-22 NOTE — ASSESSMENT
[Palliative] : Goals of care discussed with patient: Palliative [Palliative Care Plan] : not applicable at this time [FreeTextEntry1] : 52-year-old, previously healthy man with B-CLL, Stage 0. being observed IGVH unmut, del(13q) doing well, no further resp infections CBC results reviewed and d/w pt WBC 31.31,  Hgb 15.0,  PLT 207K, ANC 5.17,, ALC 23.33 ALC today similar to 12/2022 and 3/2023 settling into indolent course   cont observation, no evidence of POD lung nodule to be followed with repeat CT chest  check CMP, LDH, Igs   RV 4-6 mos

## 2023-08-22 NOTE — HISTORY OF PRESENT ILLNESS
[Disease:__________________________] : Disease: [unfilled] [de-identified] : Mr. Fuentes is a 50-year-old man referred for evaluation of leukocytosis with lymphocytosis.  He has had generally good health.  A few weeks ago, he developed left scrotal pain and had a urologic evaluation which demonstrated left epididymitis and a varicocele.  He improved rapidly, was treated with a 2 wk course of levaquin.   Color-flow Doppler scrotal sonogram was normal.\par  On 7/13/2021, the WBC was 24.8 with an ALC of 16.59.  Smudge cells were seen present.  Repeat CBC on 8/16 showed the WBC to be 26.42 with a similar absolute lymphocyte count.  Hgb and PLT were normal both times.\par  He has several orthopedic complaints and is active in sports.  His past history is otherwise unremarkable.  He has no constitutional complaints. [de-identified] : pending [de-identified] : No recent resp infections after Covid last yr no constitutional c/o Recent CT chest shows clearing of right sided GGOs; has new 5 mm nodule

## 2023-08-22 NOTE — REVIEW OF SYSTEMS
[Fatigue] : fatigue [Joint Pain] : joint pain [Joint Stiffness] : joint stiffness [Negative] : Allergic/Immunologic [Fever] : no fever [Chills] : no chills [Night Sweats] : no night sweats [Recent Change In Weight] : ~T no recent weight change [Wheezing] : no wheezing [SOB on Exertion] : no shortness of breath during exertion [Dysuria] : no dysuria [Muscle Pain] : no muscle pain [Muscle Weakness] : no muscle weakness [Easy Bleeding] : no tendency for easy bleeding [Easy Bruising] : no tendency for easy bruising [Swollen Glands] : no swollen glands [FreeTextEntry6] : resolution of PNA [FreeTextEntry9] : left hip, knee chronic

## 2023-12-26 ENCOUNTER — OUTPATIENT (OUTPATIENT)
Dept: OUTPATIENT SERVICES | Facility: HOSPITAL | Age: 53
LOS: 1 days | Discharge: HOME | End: 2023-12-26

## 2023-12-26 DIAGNOSIS — D72.829 ELEVATED WHITE BLOOD CELL COUNT, UNSPECIFIED: ICD-10-CM

## 2024-01-09 ENCOUNTER — APPOINTMENT (OUTPATIENT)
Dept: HEMATOLOGY ONCOLOGY | Facility: CLINIC | Age: 54
End: 2024-01-09
Payer: MEDICAID

## 2024-01-09 ENCOUNTER — RESULT REVIEW (OUTPATIENT)
Age: 54
End: 2024-01-09

## 2024-01-09 VITALS
DIASTOLIC BLOOD PRESSURE: 81 MMHG | TEMPERATURE: 97.2 F | RESPIRATION RATE: 16 BRPM | OXYGEN SATURATION: 98 % | BODY MASS INDEX: 26.78 KG/M2 | SYSTOLIC BLOOD PRESSURE: 121 MMHG | HEART RATE: 79 BPM | WEIGHT: 195.77 LBS

## 2024-01-09 DIAGNOSIS — Z86.16 PERSONAL HISTORY OF COVID-19: ICD-10-CM

## 2024-01-09 LAB
ALBUMIN MFR SERPL ELPH: 67.1 %
ALBUMIN SERPL ELPH-MCNC: 4.5 G/DL
ALBUMIN SERPL ELPH-MCNC: 4.6 G/DL
ALBUMIN SERPL ELPH-MCNC: 4.8 G/DL
ALBUMIN SERPL ELPH-MCNC: 4.8 G/DL
ALBUMIN SERPL ELPH-MCNC: 5 G/DL
ALBUMIN SERPL-MCNC: 4.7 G/DL
ALBUMIN/GLOB SERPL: 2 RATIO
ALP BLD-CCNC: 60 U/L
ALP BLD-CCNC: 62 U/L
ALP BLD-CCNC: 63 U/L
ALP BLD-CCNC: 65 U/L
ALP BLD-CCNC: 66 U/L
ALPHA1 GLOB MFR SERPL ELPH: 3.9 %
ALPHA1 GLOB SERPL ELPH-MCNC: 0.3 G/DL
ALPHA2 GLOB MFR SERPL ELPH: 8.6 %
ALPHA2 GLOB SERPL ELPH-MCNC: 0.6 G/DL
ALT SERPL-CCNC: 23 U/L
ALT SERPL-CCNC: 23 U/L
ALT SERPL-CCNC: 25 U/L
ALT SERPL-CCNC: 25 U/L
ALT SERPL-CCNC: 39 U/L
ANION GAP SERPL CALC-SCNC: 10 MMOL/L
ANION GAP SERPL CALC-SCNC: 10 MMOL/L
ANION GAP SERPL CALC-SCNC: 12 MMOL/L
ANION GAP SERPL CALC-SCNC: 13 MMOL/L
ANION GAP SERPL CALC-SCNC: 13 MMOL/L
AST SERPL-CCNC: 18 U/L
AST SERPL-CCNC: 19 U/L
AST SERPL-CCNC: 21 U/L
AST SERPL-CCNC: 22 U/L
AST SERPL-CCNC: 22 U/L
B-GLOBULIN MFR SERPL ELPH: 10.1 %
B-GLOBULIN SERPL ELPH-MCNC: 0.7 G/DL
B2 MICROGLOB SERPL-MCNC: 1.7 MG/L
BASOPHILS # BLD AUTO: 0 K/UL — SIGNIFICANT CHANGE UP (ref 0–0.2)
BASOPHILS # BLD AUTO: 0 K/UL — SIGNIFICANT CHANGE UP (ref 0–0.2)
BASOPHILS NFR BLD AUTO: 0 % — SIGNIFICANT CHANGE UP (ref 0–2)
BASOPHILS NFR BLD AUTO: 0 % — SIGNIFICANT CHANGE UP (ref 0–2)
BILIRUB SERPL-MCNC: 0.4 MG/DL
BILIRUB SERPL-MCNC: 0.5 MG/DL
BILIRUB SERPL-MCNC: 0.5 MG/DL
BUN SERPL-MCNC: 18 MG/DL
BUN SERPL-MCNC: 20 MG/DL
BUN SERPL-MCNC: 20 MG/DL
CALCIUM SERPL-MCNC: 9.2 MG/DL
CALCIUM SERPL-MCNC: 9.2 MG/DL
CALCIUM SERPL-MCNC: 9.6 MG/DL
CALCIUM SERPL-MCNC: 9.6 MG/DL
CALCIUM SERPL-MCNC: 9.7 MG/DL
CHLORIDE SERPL-SCNC: 101 MMOL/L
CHLORIDE SERPL-SCNC: 101 MMOL/L
CHLORIDE SERPL-SCNC: 102 MMOL/L
CHLORIDE SERPL-SCNC: 102 MMOL/L
CHLORIDE SERPL-SCNC: 104 MMOL/L
CO2 SERPL-SCNC: 24 MMOL/L
CO2 SERPL-SCNC: 24 MMOL/L
CO2 SERPL-SCNC: 26 MMOL/L
CO2 SERPL-SCNC: 26 MMOL/L
CO2 SERPL-SCNC: 27 MMOL/L
CREAT SERPL-MCNC: 0.9 MG/DL
CREAT SERPL-MCNC: 0.99 MG/DL
CREAT SERPL-MCNC: 1.01 MG/DL
CREAT SERPL-MCNC: 1.02 MG/DL
CREAT SERPL-MCNC: 1.04 MG/DL
DEPRECATED KAPPA LC FREE/LAMBDA SER: 1.93 RATIO
DEPRECATED KAPPA LC FREE/LAMBDA SER: 2.49 RATIO
DEPRECATED KAPPA LC FREE/LAMBDA SER: 2.79 RATIO
DEPRECATED KAPPA LC FREE/LAMBDA SER: 3.45 RATIO
DEPRECATED KAPPA LC FREE/LAMBDA SER: 5.51 RATIO
EGFR: 103 ML/MIN/1.73M2
EGFR: 85 ML/MIN/1.73M2
EGFR: 88 ML/MIN/1.73M2
EOSINOPHIL # BLD AUTO: 0.43 K/UL — SIGNIFICANT CHANGE UP (ref 0–0.5)
EOSINOPHIL # BLD AUTO: 0.43 K/UL — SIGNIFICANT CHANGE UP (ref 0–0.5)
EOSINOPHIL NFR BLD AUTO: 1 % — SIGNIFICANT CHANGE UP (ref 0–6)
EOSINOPHIL NFR BLD AUTO: 1 % — SIGNIFICANT CHANGE UP (ref 0–6)
GAMMA GLOB FLD ELPH-MCNC: 0.7 G/DL
GAMMA GLOB MFR SERPL ELPH: 10.3 %
GLUCOSE SERPL-MCNC: 114 MG/DL
GLUCOSE SERPL-MCNC: 117 MG/DL
GLUCOSE SERPL-MCNC: 126 MG/DL
GLUCOSE SERPL-MCNC: 97 MG/DL
GLUCOSE SERPL-MCNC: 97 MG/DL
HBV CORE IGG+IGM SER QL: NONREACTIVE
HBV SURFACE AG SER QL: NONREACTIVE
HCT VFR BLD CALC: 48.4 % — SIGNIFICANT CHANGE UP (ref 39–50)
HCT VFR BLD CALC: 48.4 % — SIGNIFICANT CHANGE UP (ref 39–50)
HCV AB SER QL: NONREACTIVE
HCV S/CO RATIO: 0.09 S/CO
HGB BLD-MCNC: 17.1 G/DL — HIGH (ref 13–17)
HGB BLD-MCNC: 17.1 G/DL — HIGH (ref 13–17)
IGA SER QL IEP: 131 MG/DL
IGA SER QL IEP: 138 MG/DL
IGA SER QL IEP: 139 MG/DL
IGA SER QL IEP: 172 MG/DL
IGA SER QL IEP: 176 MG/DL
IGG SER QL IEP: 603 MG/DL
IGG SER QL IEP: 696 MG/DL
IGG SER QL IEP: 732 MG/DL
IGG SER QL IEP: 810 MG/DL
IGG SER QL IEP: 924 MG/DL
IGM SER QL IEP: 18 MG/DL
IGM SER QL IEP: 20 MG/DL
IGM SER QL IEP: 22 MG/DL
IGM SER QL IEP: 31 MG/DL
IGM SER QL IEP: 32 MG/DL
IGVH MUTATION ANALYSIS: NEGATIVE
INTERPRETATION SERPL IEP-IMP: NORMAL
KAPPA LC CSF-MCNC: 0.73 MG/DL
KAPPA LC CSF-MCNC: 0.74 MG/DL
KAPPA LC CSF-MCNC: 0.75 MG/DL
KAPPA LC CSF-MCNC: 0.8 MG/DL
KAPPA LC CSF-MCNC: 0.88 MG/DL
KAPPA LC SERPL-MCNC: 1.41 MG/DL
KAPPA LC SERPL-MCNC: 1.99 MG/DL
KAPPA LC SERPL-MCNC: 2.09 MG/DL
KAPPA LC SERPL-MCNC: 3.04 MG/DL
KAPPA LC SERPL-MCNC: 4.08 MG/DL
LDH SERPL-CCNC: 141 U/L
LDH SERPL-CCNC: 158 U/L
LDH SERPL-CCNC: 160 U/L
LDH SERPL-CCNC: 160 U/L
LDH SERPL-CCNC: 169 U/L
LYMPHOCYTES # BLD AUTO: 34.5 K/UL — HIGH (ref 1–3.3)
LYMPHOCYTES # BLD AUTO: 34.5 K/UL — HIGH (ref 1–3.3)
LYMPHOCYTES # BLD AUTO: 80 % — HIGH (ref 13–44)
LYMPHOCYTES # BLD AUTO: 80 % — HIGH (ref 13–44)
LYMPHOCYTES # SPEC AUTO: 7 % — HIGH (ref 0–0)
LYMPHOCYTES # SPEC AUTO: 7 % — HIGH (ref 0–0)
M PROTEIN SPEC IFE-MCNC: NORMAL
MCHC RBC-ENTMCNC: 30.2 PG — SIGNIFICANT CHANGE UP (ref 27–34)
MCHC RBC-ENTMCNC: 30.2 PG — SIGNIFICANT CHANGE UP (ref 27–34)
MCHC RBC-ENTMCNC: 35.3 G/DL — SIGNIFICANT CHANGE UP (ref 32–36)
MCHC RBC-ENTMCNC: 35.3 G/DL — SIGNIFICANT CHANGE UP (ref 32–36)
MCV RBC AUTO: 85.4 FL — SIGNIFICANT CHANGE UP (ref 80–100)
MCV RBC AUTO: 85.4 FL — SIGNIFICANT CHANGE UP (ref 80–100)
MONOCYTES # BLD AUTO: 0.43 K/UL — SIGNIFICANT CHANGE UP (ref 0–0.9)
MONOCYTES # BLD AUTO: 0.43 K/UL — SIGNIFICANT CHANGE UP (ref 0–0.9)
MONOCYTES NFR BLD AUTO: 1 % — LOW (ref 2–14)
MONOCYTES NFR BLD AUTO: 1 % — LOW (ref 2–14)
NEUTROPHILS # BLD AUTO: 4.74 K/UL — SIGNIFICANT CHANGE UP (ref 1.8–7.4)
NEUTROPHILS # BLD AUTO: 4.74 K/UL — SIGNIFICANT CHANGE UP (ref 1.8–7.4)
NEUTROPHILS NFR BLD AUTO: 11 % — LOW (ref 43–77)
NEUTROPHILS NFR BLD AUTO: 11 % — LOW (ref 43–77)
NRBC # BLD: 0 /100 WBCS — SIGNIFICANT CHANGE UP (ref 0–0)
NRBC # BLD: 0 /100 WBCS — SIGNIFICANT CHANGE UP (ref 0–0)
NRBC # BLD: SIGNIFICANT CHANGE UP /100 WBCS (ref 0–0)
NRBC # BLD: SIGNIFICANT CHANGE UP /100 WBCS (ref 0–0)
PLAT MORPH BLD: NORMAL — SIGNIFICANT CHANGE UP
PLAT MORPH BLD: NORMAL — SIGNIFICANT CHANGE UP
PLATELET # BLD AUTO: 207 K/UL — SIGNIFICANT CHANGE UP (ref 150–400)
PLATELET # BLD AUTO: 207 K/UL — SIGNIFICANT CHANGE UP (ref 150–400)
POTASSIUM SERPL-SCNC: 4.2 MMOL/L
POTASSIUM SERPL-SCNC: 4.8 MMOL/L
POTASSIUM SERPL-SCNC: 4.9 MMOL/L
POTASSIUM SERPL-SCNC: 5 MMOL/L
POTASSIUM SERPL-SCNC: 5 MMOL/L
PROT SERPL-MCNC: 6.3 G/DL
PROT SERPL-MCNC: 6.4 G/DL
PROT SERPL-MCNC: 6.5 G/DL
PROT SERPL-MCNC: 7 G/DL
RBC # BLD: 5.67 M/UL — SIGNIFICANT CHANGE UP (ref 4.2–5.8)
RBC # BLD: 5.67 M/UL — SIGNIFICANT CHANGE UP (ref 4.2–5.8)
RBC # FLD: 12.7 % — SIGNIFICANT CHANGE UP (ref 10.3–14.5)
RBC # FLD: 12.7 % — SIGNIFICANT CHANGE UP (ref 10.3–14.5)
RBC BLD AUTO: SIGNIFICANT CHANGE UP
RBC BLD AUTO: SIGNIFICANT CHANGE UP
SMUDGE CELLS # BLD: PRESENT — SIGNIFICANT CHANGE UP
SMUDGE CELLS # BLD: PRESENT — SIGNIFICANT CHANGE UP
SODIUM SERPL-SCNC: 137 MMOL/L
SODIUM SERPL-SCNC: 139 MMOL/L
SODIUM SERPL-SCNC: 141 MMOL/L
SPECIMEN SOURCE: NORMAL
URATE SERPL-MCNC: 5 MG/DL
URATE SERPL-MCNC: 5.5 MG/DL
URATE SERPL-MCNC: 5.7 MG/DL
VIABILITY: 70 %
WBC # BLD: 43.12 K/UL — CRITICAL HIGH (ref 3.8–10.5)
WBC # BLD: 43.12 K/UL — CRITICAL HIGH (ref 3.8–10.5)
WBC # FLD AUTO: 43.12 K/UL — CRITICAL HIGH (ref 3.8–10.5)
WBC # FLD AUTO: 43.12 K/UL — CRITICAL HIGH (ref 3.8–10.5)
ZAP70+/CD19+: 18 %

## 2024-01-09 PROCEDURE — 99214 OFFICE O/P EST MOD 30 MIN: CPT

## 2024-01-09 RX ORDER — AZITHROMYCIN 250 MG/1
250 TABLET, FILM COATED ORAL
Refills: 0 | Status: DISCONTINUED | COMMUNITY
Start: 2023-08-21 | End: 2024-01-09

## 2024-05-01 ENCOUNTER — OUTPATIENT (OUTPATIENT)
Dept: OUTPATIENT SERVICES | Facility: HOSPITAL | Age: 54
LOS: 1 days | Discharge: HOME | End: 2024-05-01

## 2024-05-01 DIAGNOSIS — D72.829 ELEVATED WHITE BLOOD CELL COUNT, UNSPECIFIED: ICD-10-CM

## 2024-05-09 ENCOUNTER — APPOINTMENT (OUTPATIENT)
Dept: HEMATOLOGY ONCOLOGY | Facility: CLINIC | Age: 54
End: 2024-05-09

## 2024-05-21 ENCOUNTER — APPOINTMENT (OUTPATIENT)
Dept: HEMATOLOGY ONCOLOGY | Facility: CLINIC | Age: 54
End: 2024-05-21

## 2024-06-03 ENCOUNTER — APPOINTMENT (OUTPATIENT)
Dept: HEMATOLOGY ONCOLOGY | Facility: CLINIC | Age: 54
End: 2024-06-03
Payer: COMMERCIAL

## 2024-06-03 ENCOUNTER — RESULT REVIEW (OUTPATIENT)
Age: 54
End: 2024-06-03

## 2024-06-03 VITALS
BODY MASS INDEX: 26.03 KG/M2 | TEMPERATURE: 96.6 F | RESPIRATION RATE: 16 BRPM | SYSTOLIC BLOOD PRESSURE: 109 MMHG | OXYGEN SATURATION: 99 % | WEIGHT: 190.26 LBS | DIASTOLIC BLOOD PRESSURE: 74 MMHG | HEART RATE: 72 BPM

## 2024-06-03 DIAGNOSIS — R91.8 OTHER NONSPECIFIC ABNORMAL FINDING OF LUNG FIELD: ICD-10-CM

## 2024-06-03 DIAGNOSIS — C91.10 CHRONIC LYMPHOCYTIC LEUKEMIA OF B-CELL TYPE NOT HAVING ACHIEVED REMISSION: ICD-10-CM

## 2024-06-03 DIAGNOSIS — D80.1 NONFAMILIAL HYPOGAMMAGLOBULINEMIA: ICD-10-CM

## 2024-06-03 LAB
ALBUMIN SERPL ELPH-MCNC: 4.3 G/DL
ALP BLD-CCNC: 65 U/L
ALT SERPL-CCNC: 51 U/L
ANION GAP SERPL CALC-SCNC: 10 MMOL/L
AST SERPL-CCNC: 40 U/L
BASOPHILS # BLD AUTO: 0 K/UL — SIGNIFICANT CHANGE UP (ref 0–0.2)
BASOPHILS NFR BLD AUTO: 0 % — SIGNIFICANT CHANGE UP (ref 0–2)
BILIRUB SERPL-MCNC: 0.6 MG/DL
BUN SERPL-MCNC: 24 MG/DL
CALCIUM SERPL-MCNC: 9.1 MG/DL
CHLORIDE SERPL-SCNC: 104 MMOL/L
CO2 SERPL-SCNC: 23 MMOL/L
CREAT SERPL-MCNC: 0.9 MG/DL
DEPRECATED KAPPA LC FREE/LAMBDA SER: 4.62 RATIO
EGFR: 101 ML/MIN/1.73M2
EOSINOPHIL # BLD AUTO: 0 K/UL — SIGNIFICANT CHANGE UP (ref 0–0.5)
EOSINOPHIL NFR BLD AUTO: 0 % — SIGNIFICANT CHANGE UP (ref 0–6)
GLUCOSE SERPL-MCNC: 118 MG/DL
HCT VFR BLD CALC: 43.5 % — SIGNIFICANT CHANGE UP (ref 39–50)
HGB BLD-MCNC: 14.9 G/DL — SIGNIFICANT CHANGE UP (ref 13–17)
IGA SER QL IEP: 114 MG/DL
IGG SER QL IEP: 550 MG/DL
IGM SER QL IEP: 21 MG/DL
KAPPA LC CSF-MCNC: 0.81 MG/DL
KAPPA LC SERPL-MCNC: 3.74 MG/DL
LDH SERPL-CCNC: 180 U/L
LYMPHOCYTES # BLD AUTO: 33.2 K/UL — HIGH (ref 1–3.3)
LYMPHOCYTES # BLD AUTO: 77.5 % — HIGH (ref 13–44)
LYMPHOCYTES # SPEC AUTO: 11.5 % — HIGH (ref 0–0)
MCHC RBC-ENTMCNC: 30.1 PG — SIGNIFICANT CHANGE UP (ref 27–34)
MCHC RBC-ENTMCNC: 34.3 G/DL — SIGNIFICANT CHANGE UP (ref 32–36)
MCV RBC AUTO: 87.9 FL — SIGNIFICANT CHANGE UP (ref 80–100)
MONOCYTES # BLD AUTO: 0 K/UL — SIGNIFICANT CHANGE UP (ref 0–0.9)
MONOCYTES NFR BLD AUTO: 0 % — LOW (ref 2–14)
NEUTROPHILS # BLD AUTO: 4.71 K/UL — SIGNIFICANT CHANGE UP (ref 1.8–7.4)
NEUTROPHILS NFR BLD AUTO: 11 % — LOW (ref 43–77)
NRBC # BLD: 0 /100 WBCS — SIGNIFICANT CHANGE UP (ref 0–0)
NRBC # BLD: SIGNIFICANT CHANGE UP /100 WBCS (ref 0–0)
PLAT MORPH BLD: NORMAL — SIGNIFICANT CHANGE UP
PLATELET # BLD AUTO: 188 K/UL — SIGNIFICANT CHANGE UP (ref 150–400)
POTASSIUM SERPL-SCNC: 4.5 MMOL/L
PROT SERPL-MCNC: 6.4 G/DL
RBC # BLD: 4.95 M/UL — SIGNIFICANT CHANGE UP (ref 4.2–5.8)
RBC # FLD: 13.1 % — SIGNIFICANT CHANGE UP (ref 10.3–14.5)
RBC BLD AUTO: SIGNIFICANT CHANGE UP
SMUDGE CELLS # BLD: PRESENT — SIGNIFICANT CHANGE UP
SODIUM SERPL-SCNC: 137 MMOL/L
URATE SERPL-MCNC: 4.7 MG/DL
WBC # BLD: 42.84 K/UL — CRITICAL HIGH (ref 3.8–10.5)
WBC # FLD AUTO: 42.84 K/UL — CRITICAL HIGH (ref 3.8–10.5)

## 2024-06-03 PROCEDURE — 99213 OFFICE O/P EST LOW 20 MIN: CPT

## 2024-06-05 PROBLEM — D80.1 ACQUIRED HYPOGAMMAGLOBULINEMIA: Status: ACTIVE | Noted: 2024-01-09

## 2024-06-05 PROBLEM — C91.10 CHRONIC LYMPHOCYTIC LEUKEMIA: Status: ACTIVE | Noted: 2021-09-23

## 2024-06-05 PROBLEM — R91.8 LUNG NODULES: Status: ACTIVE | Noted: 2024-01-09

## 2024-06-05 NOTE — PHYSICAL EXAM
[Fully active, able to carry on all pre-disease performance without restriction] : Status 0 - Fully active, able to carry on all pre-disease performance without restriction [Normal] : affect appropriate [de-identified] : no CVAT

## 2024-06-05 NOTE — CONSULT LETTER
[FreeTextEntry3] : [facility] M.D., [facility] [DrRc  ___] : Dr. PRESSLEY [DrRc ___] : Dr. PRESSLEY Normal

## 2024-06-05 NOTE — ASSESSMENT
[FreeTextEntry1] : 53-year-old, previously healthy man with B-CLL, Stage 0. being observed IGVH unmut, del(13q) doing well, no further resp infections infiltrates cleared in 8/2023, new nodule ? inflammatory; has pulm MD  CBC results reviewed and d/w pt WBC 42.84, Hgb 14.9, PLT 188K, ANC 4.71 , ALC 33.20 ALC stable since 1/2024 far from having lymph doubling time < 6 mos cont to have  indolent course mildly hypogamma now plan to cont observation.  check CMP, LDH, Igs RV 4 mos [Palliative] : Goals of care discussed with patient: Palliative [Palliative Care Plan] : not applicable at this time

## 2024-06-05 NOTE — REVIEW OF SYSTEMS
[Fever] : no fever [Chills] : no chills [Night Sweats] : no night sweats [Fatigue] : fatigue [Recent Change In Weight] : ~T no recent weight change [Dysuria] : no dysuria [Joint Pain] : joint pain [Joint Stiffness] : joint stiffness [Muscle Pain] : no muscle pain [Muscle Weakness] : no muscle weakness [Easy Bleeding] : no tendency for easy bleeding [Easy Bruising] : no tendency for easy bruising [Swollen Glands] : no swollen glands [Negative] : Allergic/Immunologic [FreeTextEntry6] : less cough [FreeTextEntry9] : left hip, left knee chronic

## 2024-06-05 NOTE — HISTORY OF PRESENT ILLNESS
[Disease:__________________________] : Disease: [unfilled] [de-identified] : Mr. Fuentes is a 50-year-old man referred for evaluation of leukocytosis with lymphocytosis.  He has had generally good health.  A few weeks ago, he developed left scrotal pain and had a urologic evaluation which demonstrated left epididymitis and a varicocele.  He improved rapidly, was treated with a 2 wk course of levaquin.   Color-flow Doppler scrotal sonogram was normal.\par  On 7/13/2021, the WBC was 24.8 with an ALC of 16.59.  Smudge cells were seen present.  Repeat CBC on 8/16 showed the WBC to be 26.42 with a similar absolute lymphocyte count.  Hgb and PLT were normal both times.\par  He has several orthopedic complaints and is active in sports.  His past history is otherwise unremarkable.  He has no constitutional complaints. [de-identified] : pending [de-identified] : No recent resp infections feels well no constitutional c/o CT chest shows clearing of right sided GGOs; has new 5 mm nodule has pulmonologist at [facility] no pulm c/o working f/t

## 2024-10-31 ENCOUNTER — OUTPATIENT (OUTPATIENT)
Dept: OUTPATIENT SERVICES | Facility: HOSPITAL | Age: 54
LOS: 1 days | Discharge: HOME | End: 2024-10-31

## 2024-10-31 DIAGNOSIS — D72.829 ELEVATED WHITE BLOOD CELL COUNT, UNSPECIFIED: ICD-10-CM

## 2024-11-04 ENCOUNTER — APPOINTMENT (OUTPATIENT)
Dept: HEMATOLOGY ONCOLOGY | Facility: CLINIC | Age: 54
End: 2024-11-04
Payer: COMMERCIAL

## 2024-11-04 ENCOUNTER — NON-APPOINTMENT (OUTPATIENT)
Age: 54
End: 2024-11-04

## 2024-11-04 ENCOUNTER — RESULT REVIEW (OUTPATIENT)
Age: 54
End: 2024-11-04

## 2024-11-04 VITALS
HEART RATE: 70 BPM | OXYGEN SATURATION: 99 % | DIASTOLIC BLOOD PRESSURE: 81 MMHG | TEMPERATURE: 97.2 F | WEIGHT: 187.61 LBS | SYSTOLIC BLOOD PRESSURE: 124 MMHG | HEIGHT: 72 IN | BODY MASS INDEX: 25.41 KG/M2 | RESPIRATION RATE: 16 BRPM

## 2024-11-04 DIAGNOSIS — C91.10 CHRONIC LYMPHOCYTIC LEUKEMIA OF B-CELL TYPE NOT HAVING ACHIEVED REMISSION: ICD-10-CM

## 2024-11-04 DIAGNOSIS — R91.8 OTHER NONSPECIFIC ABNORMAL FINDING OF LUNG FIELD: ICD-10-CM

## 2024-11-04 DIAGNOSIS — D80.1 NONFAMILIAL HYPOGAMMAGLOBULINEMIA: ICD-10-CM

## 2024-11-04 LAB
BASOPHILS # BLD AUTO: 0 K/UL — SIGNIFICANT CHANGE UP (ref 0–0.2)
BASOPHILS NFR BLD AUTO: 0 % — SIGNIFICANT CHANGE UP (ref 0–2)
EOSINOPHIL # BLD AUTO: 0.22 K/UL — SIGNIFICANT CHANGE UP (ref 0–0.5)
EOSINOPHIL NFR BLD AUTO: 0.5 % — SIGNIFICANT CHANGE UP (ref 0–6)
HCT VFR BLD CALC: 48.8 % — SIGNIFICANT CHANGE UP (ref 39–50)
HGB BLD-MCNC: 16.7 G/DL — SIGNIFICANT CHANGE UP (ref 13–17)
LYMPHOCYTES # BLD AUTO: 28.95 K/UL — HIGH (ref 1–3.3)
LYMPHOCYTES # BLD AUTO: 64.5 % — HIGH (ref 13–44)
LYMPHOCYTES # SPEC AUTO: 15 % — HIGH (ref 0–0)
MCHC RBC-ENTMCNC: 30.5 PG — SIGNIFICANT CHANGE UP (ref 27–34)
MCHC RBC-ENTMCNC: 34.2 G/DL — SIGNIFICANT CHANGE UP (ref 32–36)
MCV RBC AUTO: 89.1 FL — SIGNIFICANT CHANGE UP (ref 80–100)
MONOCYTES # BLD AUTO: 1.35 K/UL — HIGH (ref 0–0.9)
MONOCYTES NFR BLD AUTO: 3 % — SIGNIFICANT CHANGE UP (ref 2–14)
NEUTROPHILS # BLD AUTO: 7.63 K/UL — HIGH (ref 1.8–7.4)
NEUTROPHILS NFR BLD AUTO: 17 % — LOW (ref 43–77)
NRBC # BLD: 0 /100 WBCS — SIGNIFICANT CHANGE UP (ref 0–0)
NRBC # BLD: SIGNIFICANT CHANGE UP /100 WBCS (ref 0–0)
PLAT MORPH BLD: NORMAL — SIGNIFICANT CHANGE UP
PLATELET # BLD AUTO: 193 K/UL — SIGNIFICANT CHANGE UP (ref 150–400)
RBC # BLD: 5.48 M/UL — SIGNIFICANT CHANGE UP (ref 4.2–5.8)
RBC # FLD: 12.7 % — SIGNIFICANT CHANGE UP (ref 10.3–14.5)
RBC BLD AUTO: SIGNIFICANT CHANGE UP
SMUDGE CELLS # BLD: PRESENT — SIGNIFICANT CHANGE UP
WBC # BLD: 44.89 K/UL — CRITICAL HIGH (ref 3.8–10.5)
WBC # FLD AUTO: 44.89 K/UL — CRITICAL HIGH (ref 3.8–10.5)

## 2024-11-04 PROCEDURE — G2211 COMPLEX E/M VISIT ADD ON: CPT

## 2024-11-04 PROCEDURE — 99214 OFFICE O/P EST MOD 30 MIN: CPT

## 2024-11-04 RX ORDER — ROSUVASTATIN CALCIUM 20 MG/1
20 TABLET, FILM COATED ORAL DAILY
Refills: 0 | Status: ACTIVE | COMMUNITY

## 2024-12-16 ENCOUNTER — TRANSCRIPTION ENCOUNTER (OUTPATIENT)
Age: 54
End: 2024-12-16

## 2025-02-11 ENCOUNTER — OUTPATIENT (OUTPATIENT)
Dept: OUTPATIENT SERVICES | Facility: HOSPITAL | Age: 55
LOS: 1 days | Discharge: HOME | End: 2025-02-11

## 2025-02-11 ENCOUNTER — TRANSCRIPTION ENCOUNTER (OUTPATIENT)
Age: 55
End: 2025-02-11

## 2025-02-11 DIAGNOSIS — D72.829 ELEVATED WHITE BLOOD CELL COUNT, UNSPECIFIED: ICD-10-CM

## 2025-05-02 ENCOUNTER — OUTPATIENT (OUTPATIENT)
Dept: OUTPATIENT SERVICES | Facility: HOSPITAL | Age: 55
LOS: 1 days | Discharge: HOME | End: 2025-05-02

## 2025-05-02 DIAGNOSIS — D72.829 ELEVATED WHITE BLOOD CELL COUNT, UNSPECIFIED: ICD-10-CM

## 2025-05-05 ENCOUNTER — RESULT REVIEW (OUTPATIENT)
Age: 55
End: 2025-05-05

## 2025-05-05 ENCOUNTER — APPOINTMENT (OUTPATIENT)
Dept: HEMATOLOGY ONCOLOGY | Facility: CLINIC | Age: 55
End: 2025-05-05

## 2025-05-05 ENCOUNTER — APPOINTMENT (OUTPATIENT)
Dept: HEMATOLOGY ONCOLOGY | Facility: CLINIC | Age: 55
End: 2025-05-05
Payer: COMMERCIAL

## 2025-05-05 VITALS
RESPIRATION RATE: 16 BRPM | TEMPERATURE: 97.2 F | HEART RATE: 72 BPM | SYSTOLIC BLOOD PRESSURE: 134 MMHG | DIASTOLIC BLOOD PRESSURE: 81 MMHG | OXYGEN SATURATION: 99 %

## 2025-05-05 DIAGNOSIS — Z78.9 OTHER SPECIFIED HEALTH STATUS: ICD-10-CM

## 2025-05-05 DIAGNOSIS — C91.10 CHRONIC LYMPHOCYTIC LEUKEMIA OF B-CELL TYPE NOT HAVING ACHIEVED REMISSION: ICD-10-CM

## 2025-05-05 LAB
ALBUMIN SERPL ELPH-MCNC: 4.7 G/DL
ALP BLD-CCNC: 55 U/L
ALT SERPL-CCNC: 27 U/L
ANION GAP SERPL CALC-SCNC: 12 MMOL/L
AST SERPL-CCNC: 26 U/L
BASOPHILS # BLD AUTO: 0 K/UL — SIGNIFICANT CHANGE UP (ref 0–0.2)
BASOPHILS NFR BLD AUTO: 0 % — SIGNIFICANT CHANGE UP (ref 0–2)
BILIRUB SERPL-MCNC: 0.4 MG/DL
BUN SERPL-MCNC: 19 MG/DL
CALCIUM SERPL-MCNC: 9.3 MG/DL
CHLORIDE SERPL-SCNC: 105 MMOL/L
CO2 SERPL-SCNC: 23 MMOL/L
CREAT SERPL-MCNC: 0.96 MG/DL
EGFRCR SERPLBLD CKD-EPI 2021: 93 ML/MIN/1.73M2
EOSINOPHIL # BLD AUTO: 0 K/UL — SIGNIFICANT CHANGE UP (ref 0–0.5)
EOSINOPHIL NFR BLD AUTO: 0 % — SIGNIFICANT CHANGE UP (ref 0–6)
GLUCOSE SERPL-MCNC: 98 MG/DL
HCT VFR BLD CALC: 45.7 % — SIGNIFICANT CHANGE UP (ref 39–50)
HGB BLD-MCNC: 15.7 G/DL — SIGNIFICANT CHANGE UP (ref 13–17)
LDH SERPL-CCNC: 151 U/L
LYMPHOCYTES # BLD AUTO: 34.22 K/UL — HIGH (ref 1–3.3)
LYMPHOCYTES # BLD AUTO: 75 % — HIGH (ref 13–44)
LYMPHOCYTES # SPEC AUTO: 16 % — HIGH (ref 0–0)
MCHC RBC-ENTMCNC: 30.7 PG — SIGNIFICANT CHANGE UP (ref 27–34)
MCHC RBC-ENTMCNC: 34.4 G/DL — SIGNIFICANT CHANGE UP (ref 32–36)
MCV RBC AUTO: 89.3 FL — SIGNIFICANT CHANGE UP (ref 80–100)
MONOCYTES # BLD AUTO: 0 K/UL — SIGNIFICANT CHANGE UP (ref 0–0.9)
MONOCYTES NFR BLD AUTO: 0 % — LOW (ref 2–14)
NEUTROPHILS # BLD AUTO: 4.11 K/UL — SIGNIFICANT CHANGE UP (ref 1.8–7.4)
NEUTROPHILS NFR BLD AUTO: 9 % — LOW (ref 43–77)
NRBC # BLD: 0 /100 WBCS — SIGNIFICANT CHANGE UP (ref 0–0)
NRBC BLD AUTO-RTO: SIGNIFICANT CHANGE UP /100 WBCS (ref 0–0)
NRBC BLD-RTO: 0 /100 WBCS — SIGNIFICANT CHANGE UP (ref 0–0)
PLAT MORPH BLD: NORMAL — SIGNIFICANT CHANGE UP
PLATELET # BLD AUTO: 165 K/UL — SIGNIFICANT CHANGE UP (ref 150–400)
POTASSIUM SERPL-SCNC: 4.6 MMOL/L
PROT SERPL-MCNC: 6.3 G/DL
RBC # BLD: 5.12 M/UL — SIGNIFICANT CHANGE UP (ref 4.2–5.8)
RBC # FLD: 13.1 % — SIGNIFICANT CHANGE UP (ref 10.3–14.5)
RBC BLD AUTO: SIGNIFICANT CHANGE UP
SMUDGE CELLS # BLD: PRESENT — SIGNIFICANT CHANGE UP
SODIUM SERPL-SCNC: 141 MMOL/L
URATE SERPL-MCNC: 4 MG/DL
WBC # BLD: 45.62 K/UL — CRITICAL HIGH (ref 3.8–10.5)
WBC # FLD AUTO: 45.62 K/UL — CRITICAL HIGH (ref 3.8–10.5)

## 2025-05-05 PROCEDURE — 99214 OFFICE O/P EST MOD 30 MIN: CPT

## 2025-05-07 LAB
DEPRECATED KAPPA LC FREE/LAMBDA SER: 6.04 RATIO
IGA SERPL-MCNC: 85 MG/DL
IGG SERPL-MCNC: 494 MG/DL
IGM SERPL-MCNC: 12 MG/DL
KAPPA LC CSF-MCNC: 0.68 MG/DL
KAPPA LC SERPL-MCNC: 4.11 MG/DL

## 2025-05-09 DIAGNOSIS — D80.1 NONFAMILIAL HYPOGAMMAGLOBULINEMIA: ICD-10-CM

## 2025-05-22 ENCOUNTER — TRANSCRIPTION ENCOUNTER (OUTPATIENT)
Age: 55
End: 2025-05-22

## 2025-09-03 ENCOUNTER — OUTPATIENT (OUTPATIENT)
Dept: OUTPATIENT SERVICES | Facility: HOSPITAL | Age: 55
LOS: 1 days | Discharge: HOME | End: 2025-09-03

## 2025-09-03 DIAGNOSIS — D72.829 ELEVATED WHITE BLOOD CELL COUNT, UNSPECIFIED: ICD-10-CM
